# Patient Record
Sex: MALE | Race: WHITE | NOT HISPANIC OR LATINO | Employment: OTHER | URBAN - METROPOLITAN AREA
[De-identification: names, ages, dates, MRNs, and addresses within clinical notes are randomized per-mention and may not be internally consistent; named-entity substitution may affect disease eponyms.]

---

## 2023-11-19 ENCOUNTER — HOSPITAL ENCOUNTER (INPATIENT)
Facility: MEDICAL CENTER | Age: 63
LOS: 2 days | DRG: 300 | End: 2023-11-22
Attending: STUDENT IN AN ORGANIZED HEALTH CARE EDUCATION/TRAINING PROGRAM | Admitting: STUDENT IN AN ORGANIZED HEALTH CARE EDUCATION/TRAINING PROGRAM
Payer: MEDICARE

## 2023-11-19 ENCOUNTER — HOSPITAL ENCOUNTER (OUTPATIENT)
Dept: RADIOLOGY | Facility: MEDICAL CENTER | Age: 63
End: 2023-11-19
Payer: MEDICARE

## 2023-11-19 DIAGNOSIS — I10 HYPERTENSION, UNSPECIFIED TYPE: ICD-10-CM

## 2023-11-19 DIAGNOSIS — K21.9 GASTROESOPHAGEAL REFLUX DISEASE, UNSPECIFIED WHETHER ESOPHAGITIS PRESENT: ICD-10-CM

## 2023-11-19 DIAGNOSIS — I70.0 AORTIC OCCLUSION (HCC): ICD-10-CM

## 2023-11-19 PROCEDURE — 36415 COLL VENOUS BLD VENIPUNCTURE: CPT

## 2023-11-19 PROCEDURE — 96374 THER/PROPH/DIAG INJ IV PUSH: CPT

## 2023-11-19 PROCEDURE — 99285 EMERGENCY DEPT VISIT HI MDM: CPT

## 2023-11-19 PROCEDURE — 93005 ELECTROCARDIOGRAM TRACING: CPT | Performed by: STUDENT IN AN ORGANIZED HEALTH CARE EDUCATION/TRAINING PROGRAM

## 2023-11-19 RX ORDER — HEPARIN SODIUM 5000 [USP'U]/100ML
0-30 INJECTION, SOLUTION INTRAVENOUS CONTINUOUS
Status: DISCONTINUED | OUTPATIENT
Start: 2023-11-20 | End: 2023-11-20

## 2023-11-19 ASSESSMENT — FIBROSIS 4 INDEX: FIB4 SCORE: 1.2

## 2023-11-20 ENCOUNTER — APPOINTMENT (OUTPATIENT)
Dept: RADIOLOGY | Facility: MEDICAL CENTER | Age: 63
DRG: 300 | End: 2023-11-20
Attending: SURGERY
Payer: MEDICARE

## 2023-11-20 PROBLEM — I70.92 CHRONIC TOTAL OCCLUSION OF ARTERY OF THE EXTREMITIES (HCC): Status: ACTIVE | Noted: 2023-11-20

## 2023-11-20 PROBLEM — F22 DELUSIONS (HCC): Status: ACTIVE | Noted: 2023-11-20

## 2023-11-20 PROBLEM — D72.829 LEUKOCYTOSIS: Status: ACTIVE | Noted: 2023-11-20

## 2023-11-20 PROBLEM — E87.20 LACTIC ACIDOSIS: Status: ACTIVE | Noted: 2023-11-20

## 2023-11-20 PROBLEM — F17.200 TOBACCO USE DISORDER: Status: ACTIVE | Noted: 2023-11-20

## 2023-11-20 PROBLEM — I71.40 ABDOMINAL AORTIC ANEURYSM (AAA) WITHOUT RUPTURE (HCC): Status: ACTIVE | Noted: 2023-11-20

## 2023-11-20 PROBLEM — I74.5 ILIAC ARTERY OCCLUSION (HCC): Status: ACTIVE | Noted: 2023-11-20

## 2023-11-20 LAB
ABO + RH BLD: NORMAL
ABO GROUP BLD: NORMAL
ALBUMIN SERPL BCP-MCNC: 4 G/DL (ref 3.2–4.9)
ALBUMIN SERPL BCP-MCNC: 4.3 G/DL (ref 3.2–4.9)
ALBUMIN/GLOB SERPL: 1.3 G/DL
ALBUMIN/GLOB SERPL: 1.3 G/DL
ALP SERPL-CCNC: 151 U/L (ref 30–99)
ALP SERPL-CCNC: 164 U/L (ref 30–99)
ALT SERPL-CCNC: 11 U/L (ref 2–50)
ALT SERPL-CCNC: 9 U/L (ref 2–50)
ANION GAP SERPL CALC-SCNC: 11 MMOL/L (ref 7–16)
ANION GAP SERPL CALC-SCNC: 16 MMOL/L (ref 7–16)
APTT PPP: 148.1 SEC (ref 24.7–36)
AST SERPL-CCNC: 12 U/L (ref 12–45)
AST SERPL-CCNC: 14 U/L (ref 12–45)
BASOPHILS # BLD AUTO: 0.2 % (ref 0–1.8)
BASOPHILS # BLD: 0.02 K/UL (ref 0–0.12)
BILIRUB SERPL-MCNC: 0.4 MG/DL (ref 0.1–1.5)
BILIRUB SERPL-MCNC: 0.4 MG/DL (ref 0.1–1.5)
BLD GP AB SCN SERPL QL: NORMAL
BUN SERPL-MCNC: 11 MG/DL (ref 8–22)
BUN SERPL-MCNC: 11 MG/DL (ref 8–22)
CALCIUM ALBUM COR SERPL-MCNC: 9.1 MG/DL (ref 8.5–10.5)
CALCIUM ALBUM COR SERPL-MCNC: 9.2 MG/DL (ref 8.5–10.5)
CALCIUM SERPL-MCNC: 9.1 MG/DL (ref 8.5–10.5)
CALCIUM SERPL-MCNC: 9.4 MG/DL (ref 8.5–10.5)
CFT BLD TEG: >17 MIN (ref 4.6–9.1)
CFT P HPASE BLD TEG: 8 MIN (ref 4.3–8.3)
CHLORIDE SERPL-SCNC: 100 MMOL/L (ref 96–112)
CHLORIDE SERPL-SCNC: 102 MMOL/L (ref 96–112)
CLOT ANGLE BLD TEG: 50.5 DEGREES (ref 63–78)
CO2 SERPL-SCNC: 20 MMOL/L (ref 20–33)
CO2 SERPL-SCNC: 23 MMOL/L (ref 20–33)
CREAT SERPL-MCNC: 0.83 MG/DL (ref 0.5–1.4)
CREAT SERPL-MCNC: 0.95 MG/DL (ref 0.5–1.4)
CT.EXTRINSIC BLD ROTEM: 3.8 MIN (ref 0.8–2.1)
EKG IMPRESSION: NORMAL
EOSINOPHIL # BLD AUTO: 0.01 K/UL (ref 0–0.51)
EOSINOPHIL NFR BLD: 0.1 % (ref 0–6.9)
ERYTHROCYTE [DISTWIDTH] IN BLOOD BY AUTOMATED COUNT: 43.8 FL (ref 35.9–50)
GFR SERPLBLD CREATININE-BSD FMLA CKD-EPI: 100 ML/MIN/1.73 M 2
GFR SERPLBLD CREATININE-BSD FMLA CKD-EPI: 91 ML/MIN/1.73 M 2
GLOBULIN SER CALC-MCNC: 3.1 G/DL (ref 1.9–3.5)
GLOBULIN SER CALC-MCNC: 3.3 G/DL (ref 1.9–3.5)
GLUCOSE SERPL-MCNC: 109 MG/DL (ref 65–99)
GLUCOSE SERPL-MCNC: 126 MG/DL (ref 65–99)
HCT VFR BLD AUTO: 44.2 % (ref 42–52)
HGB BLD-MCNC: 15.5 G/DL (ref 14–18)
IMM GRANULOCYTES # BLD AUTO: 0.07 K/UL (ref 0–0.11)
IMM GRANULOCYTES NFR BLD AUTO: 0.6 % (ref 0–0.9)
INR PPP: 1.22 (ref 0.87–1.13)
LACTATE SERPL-SCNC: 0.9 MMOL/L (ref 0.5–2)
LACTATE SERPL-SCNC: 2.8 MMOL/L (ref 0.5–2)
LYMPHOCYTES # BLD AUTO: 1.43 K/UL (ref 1–4.8)
LYMPHOCYTES NFR BLD: 12 % (ref 22–41)
MAGNESIUM SERPL-MCNC: 1.9 MG/DL (ref 1.5–2.5)
MCF BLD TEG: 43.4 MM (ref 52–69)
MCF.PLATELET INHIB BLD ROTEM: 19.3 MM (ref 15–32)
MCH RBC QN AUTO: 30 PG (ref 27–33)
MCHC RBC AUTO-ENTMCNC: 35.1 G/DL (ref 32.3–36.5)
MCV RBC AUTO: 85.5 FL (ref 81.4–97.8)
MONOCYTES # BLD AUTO: 0.69 K/UL (ref 0–0.85)
MONOCYTES NFR BLD AUTO: 5.8 % (ref 0–13.4)
NEUTROPHILS # BLD AUTO: 9.73 K/UL (ref 1.82–7.42)
NEUTROPHILS NFR BLD: 81.3 % (ref 44–72)
NRBC # BLD AUTO: 0 K/UL
NRBC BLD-RTO: 0 /100 WBC (ref 0–0.2)
PA AA BLD-ACNC: 3.5 % (ref 0–11)
PA ADP BLD-ACNC: 5.5 % (ref 0–17)
PLATELET # BLD AUTO: 209 K/UL (ref 164–446)
PMV BLD AUTO: 9.3 FL (ref 9–12.9)
POTASSIUM SERPL-SCNC: 3.8 MMOL/L (ref 3.6–5.5)
POTASSIUM SERPL-SCNC: 4.1 MMOL/L (ref 3.6–5.5)
PROT SERPL-MCNC: 7.1 G/DL (ref 6–8.2)
PROT SERPL-MCNC: 7.6 G/DL (ref 6–8.2)
PROTHROMBIN TIME: 15.5 SEC (ref 12–14.6)
RBC # BLD AUTO: 5.17 M/UL (ref 4.7–6.1)
RH BLD: NORMAL
SODIUM SERPL-SCNC: 136 MMOL/L (ref 135–145)
SODIUM SERPL-SCNC: 136 MMOL/L (ref 135–145)
TEG ALGORITHM TGALG: ABNORMAL
UFH PPP CHRO-ACNC: 0.4 IU/ML
UFH PPP CHRO-ACNC: 0.46 IU/ML
WBC # BLD AUTO: 12 K/UL (ref 4.8–10.8)

## 2023-11-20 PROCEDURE — 99223 1ST HOSP IP/OBS HIGH 75: CPT | Mod: 25,AI,GC | Performed by: STUDENT IN AN ORGANIZED HEALTH CARE EDUCATION/TRAINING PROGRAM

## 2023-11-20 PROCEDURE — 83605 ASSAY OF LACTIC ACID: CPT | Mod: 91

## 2023-11-20 PROCEDURE — A9270 NON-COVERED ITEM OR SERVICE: HCPCS | Performed by: STUDENT IN AN ORGANIZED HEALTH CARE EDUCATION/TRAINING PROGRAM

## 2023-11-20 PROCEDURE — 85384 FIBRINOGEN ACTIVITY: CPT

## 2023-11-20 PROCEDURE — 700111 HCHG RX REV CODE 636 W/ 250 OVERRIDE (IP): Mod: JZ | Performed by: STUDENT IN AN ORGANIZED HEALTH CARE EDUCATION/TRAINING PROGRAM

## 2023-11-20 PROCEDURE — 85347 COAGULATION TIME ACTIVATED: CPT

## 2023-11-20 PROCEDURE — 36415 COLL VENOUS BLD VENIPUNCTURE: CPT

## 2023-11-20 PROCEDURE — 86850 RBC ANTIBODY SCREEN: CPT

## 2023-11-20 PROCEDURE — 700102 HCHG RX REV CODE 250 W/ 637 OVERRIDE(OP): Performed by: STUDENT IN AN ORGANIZED HEALTH CARE EDUCATION/TRAINING PROGRAM

## 2023-11-20 PROCEDURE — 93922 UPR/L XTREMITY ART 2 LEVELS: CPT | Mod: 26 | Performed by: INTERNAL MEDICINE

## 2023-11-20 PROCEDURE — 85610 PROTHROMBIN TIME: CPT

## 2023-11-20 PROCEDURE — 85520 HEPARIN ASSAY: CPT | Mod: 91

## 2023-11-20 PROCEDURE — 80053 COMPREHEN METABOLIC PANEL: CPT | Mod: 91

## 2023-11-20 PROCEDURE — 700111 HCHG RX REV CODE 636 W/ 250 OVERRIDE (IP): Performed by: STUDENT IN AN ORGANIZED HEALTH CARE EDUCATION/TRAINING PROGRAM

## 2023-11-20 PROCEDURE — 700105 HCHG RX REV CODE 258: Performed by: STUDENT IN AN ORGANIZED HEALTH CARE EDUCATION/TRAINING PROGRAM

## 2023-11-20 PROCEDURE — 99406 BEHAV CHNG SMOKING 3-10 MIN: CPT | Performed by: STUDENT IN AN ORGANIZED HEALTH CARE EDUCATION/TRAINING PROGRAM

## 2023-11-20 PROCEDURE — 83735 ASSAY OF MAGNESIUM: CPT

## 2023-11-20 PROCEDURE — 99223 1ST HOSP IP/OBS HIGH 75: CPT | Performed by: SURGERY

## 2023-11-20 PROCEDURE — 86901 BLOOD TYPING SEROLOGIC RH(D): CPT

## 2023-11-20 PROCEDURE — 85730 THROMBOPLASTIN TIME PARTIAL: CPT

## 2023-11-20 PROCEDURE — 99222 1ST HOSP IP/OBS MODERATE 55: CPT | Performed by: SURGERY

## 2023-11-20 PROCEDURE — 85025 COMPLETE CBC W/AUTO DIFF WBC: CPT

## 2023-11-20 PROCEDURE — 86900 BLOOD TYPING SEROLOGIC ABO: CPT

## 2023-11-20 PROCEDURE — 770001 HCHG ROOM/CARE - MED/SURG/GYN PRIV*

## 2023-11-20 PROCEDURE — 93922 UPR/L XTREMITY ART 2 LEVELS: CPT

## 2023-11-20 PROCEDURE — 85576 BLOOD PLATELET AGGREGATION: CPT

## 2023-11-20 RX ORDER — OXYCODONE HYDROCHLORIDE 5 MG/1
5 TABLET ORAL EVERY 6 HOURS PRN
Status: DISCONTINUED | OUTPATIENT
Start: 2023-11-20 | End: 2023-11-22 | Stop reason: HOSPADM

## 2023-11-20 RX ORDER — ACETAMINOPHEN 325 MG/1
650 TABLET ORAL EVERY 6 HOURS PRN
Status: DISCONTINUED | OUTPATIENT
Start: 2023-11-20 | End: 2023-11-22 | Stop reason: HOSPADM

## 2023-11-20 RX ORDER — HEPARIN SODIUM 5000 [USP'U]/100ML
0-30 INJECTION, SOLUTION INTRAVENOUS CONTINUOUS
Status: DISCONTINUED | OUTPATIENT
Start: 2023-11-20 | End: 2023-11-21

## 2023-11-20 RX ORDER — SODIUM CHLORIDE, SODIUM LACTATE, POTASSIUM CHLORIDE, CALCIUM CHLORIDE 600; 310; 30; 20 MG/100ML; MG/100ML; MG/100ML; MG/100ML
INJECTION, SOLUTION INTRAVENOUS CONTINUOUS
Status: DISCONTINUED | OUTPATIENT
Start: 2023-11-20 | End: 2023-11-21

## 2023-11-20 RX ORDER — NICOTINE 21 MG/24HR
21 PATCH, TRANSDERMAL 24 HOURS TRANSDERMAL
Status: DISCONTINUED | OUTPATIENT
Start: 2023-11-20 | End: 2023-11-22 | Stop reason: HOSPADM

## 2023-11-20 RX ORDER — AMLODIPINE BESYLATE 10 MG/1
10 TABLET ORAL
Status: DISCONTINUED | OUTPATIENT
Start: 2023-11-21 | End: 2023-11-22 | Stop reason: HOSPADM

## 2023-11-20 RX ORDER — POLYETHYLENE GLYCOL 3350 17 G/17G
1 POWDER, FOR SOLUTION ORAL
Status: DISCONTINUED | OUTPATIENT
Start: 2023-11-20 | End: 2023-11-22 | Stop reason: HOSPADM

## 2023-11-20 RX ORDER — BISACODYL 10 MG
10 SUPPOSITORY, RECTAL RECTAL
Status: DISCONTINUED | OUTPATIENT
Start: 2023-11-20 | End: 2023-11-22 | Stop reason: HOSPADM

## 2023-11-20 RX ORDER — ONDANSETRON 2 MG/ML
4 INJECTION INTRAMUSCULAR; INTRAVENOUS EVERY 4 HOURS PRN
Status: DISCONTINUED | OUTPATIENT
Start: 2023-11-20 | End: 2023-11-22 | Stop reason: HOSPADM

## 2023-11-20 RX ORDER — AMLODIPINE BESYLATE 5 MG/1
5 TABLET ORAL ONCE
Status: COMPLETED | OUTPATIENT
Start: 2023-11-20 | End: 2023-11-20

## 2023-11-20 RX ORDER — AMLODIPINE BESYLATE 5 MG/1
5 TABLET ORAL
Status: DISCONTINUED | OUTPATIENT
Start: 2023-11-20 | End: 2023-11-20

## 2023-11-20 RX ORDER — AMOXICILLIN 250 MG
2 CAPSULE ORAL 2 TIMES DAILY
Status: DISCONTINUED | OUTPATIENT
Start: 2023-11-20 | End: 2023-11-22 | Stop reason: HOSPADM

## 2023-11-20 RX ORDER — MORPHINE SULFATE 4 MG/ML
4 INJECTION INTRAVENOUS EVERY 4 HOURS PRN
Status: DISCONTINUED | OUTPATIENT
Start: 2023-11-20 | End: 2023-11-22 | Stop reason: HOSPADM

## 2023-11-20 RX ORDER — HEPARIN SODIUM 1000 [USP'U]/ML
40 INJECTION, SOLUTION INTRAVENOUS; SUBCUTANEOUS PRN
Status: DISCONTINUED | OUTPATIENT
Start: 2023-11-20 | End: 2023-11-21

## 2023-11-20 RX ORDER — ONDANSETRON 4 MG/1
4 TABLET, ORALLY DISINTEGRATING ORAL EVERY 4 HOURS PRN
Status: DISCONTINUED | OUTPATIENT
Start: 2023-11-20 | End: 2023-11-22 | Stop reason: HOSPADM

## 2023-11-20 RX ORDER — FAMOTIDINE 20 MG/1
20 TABLET, FILM COATED ORAL 2 TIMES DAILY
Status: DISCONTINUED | OUTPATIENT
Start: 2023-11-20 | End: 2023-11-22 | Stop reason: HOSPADM

## 2023-11-20 RX ADMIN — SODIUM CHLORIDE, POTASSIUM CHLORIDE, SODIUM LACTATE AND CALCIUM CHLORIDE: 600; 310; 30; 20 INJECTION, SOLUTION INTRAVENOUS at 02:58

## 2023-11-20 RX ADMIN — ONDANSETRON 4 MG: 2 INJECTION INTRAMUSCULAR; INTRAVENOUS at 16:48

## 2023-11-20 RX ADMIN — OXYCODONE 5 MG: 5 TABLET ORAL at 03:20

## 2023-11-20 RX ADMIN — FAMOTIDINE 20 MG: 20 TABLET ORAL at 05:45

## 2023-11-20 RX ADMIN — HEPARIN SODIUM 18 UNITS/KG/HR: 5000 INJECTION, SOLUTION INTRAVENOUS at 00:19

## 2023-11-20 RX ADMIN — FAMOTIDINE 20 MG: 20 TABLET ORAL at 16:06

## 2023-11-20 RX ADMIN — MORPHINE SULFATE 4 MG: 4 INJECTION, SOLUTION INTRAMUSCULAR; INTRAVENOUS at 05:50

## 2023-11-20 RX ADMIN — AMLODIPINE BESYLATE 5 MG: 5 TABLET ORAL at 13:50

## 2023-11-20 RX ADMIN — HEPARIN SODIUM 18 UNITS/KG/HR: 5000 INJECTION, SOLUTION INTRAVENOUS at 21:09

## 2023-11-20 RX ADMIN — NICOTINE TRANSDERMAL SYSTEM 21 MG: 21 PATCH, EXTENDED RELEASE TRANSDERMAL at 05:45

## 2023-11-20 RX ADMIN — SODIUM CHLORIDE, POTASSIUM CHLORIDE, SODIUM LACTATE AND CALCIUM CHLORIDE: 600; 310; 30; 20 INJECTION, SOLUTION INTRAVENOUS at 22:56

## 2023-11-20 RX ADMIN — DOCUSATE SODIUM 50 MG AND SENNOSIDES 8.6 MG 2 TABLET: 8.6; 5 TABLET, FILM COATED ORAL at 16:06

## 2023-11-20 RX ADMIN — AMLODIPINE BESYLATE 5 MG: 5 TABLET ORAL at 05:44

## 2023-11-20 RX ADMIN — OXYCODONE 5 MG: 5 TABLET ORAL at 22:11

## 2023-11-20 RX ADMIN — DOCUSATE SODIUM 50 MG AND SENNOSIDES 8.6 MG 2 TABLET: 8.6; 5 TABLET, FILM COATED ORAL at 05:44

## 2023-11-20 ASSESSMENT — COGNITIVE AND FUNCTIONAL STATUS - GENERAL
SUGGESTED CMS G CODE MODIFIER MOBILITY: CJ
CLIMB 3 TO 5 STEPS WITH RAILING: A LITTLE
WALKING IN HOSPITAL ROOM: A LITTLE
DRESSING REGULAR UPPER BODY CLOTHING: A LITTLE
SUGGESTED CMS G CODE MODIFIER DAILY ACTIVITY: CI
DAILY ACTIVITIY SCORE: 23
MOBILITY SCORE: 22

## 2023-11-20 ASSESSMENT — ENCOUNTER SYMPTOMS
INSOMNIA: 1
HALLUCINATIONS: 0
CLAUDICATION: 0
HEMOPTYSIS: 0
CHILLS: 0
PALPITATIONS: 0
SENSORY CHANGE: 0
NERVOUS/ANXIOUS: 0
BLURRED VISION: 0
MYALGIAS: 0
SORE THROAT: 0
EYE PAIN: 0
ABDOMINAL PAIN: 1
DIARRHEA: 0
BACK PAIN: 0
DEPRESSION: 0
VOMITING: 0
SPEECH CHANGE: 0
DOUBLE VISION: 0
FALLS: 0
HEARTBURN: 0
CONSTIPATION: 0
BRUISES/BLEEDS EASILY: 0
DIZZINESS: 0
BLOOD IN STOOL: 0
FOCAL WEAKNESS: 0
NECK PAIN: 0
NAUSEA: 0
SPUTUM PRODUCTION: 0
POLYDIPSIA: 0
WEAKNESS: 0
COUGH: 0
HEADACHES: 0
FEVER: 0
ORTHOPNEA: 0
SHORTNESS OF BREATH: 0

## 2023-11-20 ASSESSMENT — PATIENT HEALTH QUESTIONNAIRE - PHQ9
1. LITTLE INTEREST OR PLEASURE IN DOING THINGS: NOT AT ALL
SUM OF ALL RESPONSES TO PHQ9 QUESTIONS 1 AND 2: 0
2. FEELING DOWN, DEPRESSED, IRRITABLE, OR HOPELESS: NOT AT ALL

## 2023-11-20 ASSESSMENT — PAIN DESCRIPTION - PAIN TYPE
TYPE: ACUTE PAIN
TYPE: ACUTE PAIN

## 2023-11-20 ASSESSMENT — FIBROSIS 4 INDEX: FIB4 SCORE: 1.23

## 2023-11-20 ASSESSMENT — LIFESTYLE VARIABLES
HAVE YOU EVER FELT YOU SHOULD CUT DOWN ON YOUR DRINKING: NO
TOTAL SCORE: 0
TOTAL SCORE: 0
CONSUMPTION TOTAL: NEGATIVE
EVER HAD A DRINK FIRST THING IN THE MORNING TO STEADY YOUR NERVES TO GET RID OF A HANGOVER: NO
ON A TYPICAL DAY WHEN YOU DRINK ALCOHOL HOW MANY DRINKS DO YOU HAVE: 0
HAVE PEOPLE ANNOYED YOU BY CRITICIZING YOUR DRINKING: NO
TOTAL SCORE: 0
EVER FELT BAD OR GUILTY ABOUT YOUR DRINKING: NO
ALCOHOL_USE: NO
AVERAGE NUMBER OF DAYS PER WEEK YOU HAVE A DRINK CONTAINING ALCOHOL: 0
HOW MANY TIMES IN THE PAST YEAR HAVE YOU HAD 5 OR MORE DRINKS IN A DAY: 0

## 2023-11-20 NOTE — PROGRESS NOTES
VASCULAR SURGERY SERVICE                        Progress Note  _________________________________      Patient went to [facility] last night for back pain and CT scan showed incidental finding of occlusion of aortoiliac aneurysm.  Patient was transferred to [facility] overnight for concern of acute lower extremity ischemia.    Patient does not have any evidence of acute lower extremity ischemia.  There is no pain or numbness, skin is pink and well-perfused with brisk capillary refill.  No calf tenderness, normal motor exam.    Patient does however have some evidence of chronic arterial insufficiency in the lower extremities.  He reports that he does get bilateral lower extremity pain with walking less than 100 feet.  He denies rest pain or numbness except that his left foot has some residual numbness from previous surgeries.  On exam he has weak monophasic posterior tibial Doppler signals bilaterally consistent with chronic arterial insufficiency.    Patient may warrant an aortobifemoral bypass at some point for revascularization of his lower extremities, however this does not need to be done emergently.  At this point I have ordered an ASHLEY to further assess the degree of ischemia and also an echocardiogram to assess his fitness for surgery.  If the ASHLEY indicates a severe degree of ischemia then we may need to consider doing his aortobifemoral bypass patient, however if the ASHLEY is only moderately reduced then patient could potentially be discharged and followed up as an outpatient.    Further recommendations pending the results of the ASHLEY and echocardiogram    Ady Lunsford MD  [facility] Surgery   Voalte preferred or call my office [phone]  __________________________________________________  Patient:Andrea Polanco .   MRN:[id number]

## 2023-11-20 NOTE — DISCHARGE PLANNING
"Care Transition Team Assessment    CM reviewed chart and participated in IDT rounds.  Clinical RN reports that Andrea has some questions about insurance coverage for upcoming surgery mid December.  PFA number given.  CM met with patient at bedside. AOX4, lives by self in a single level home with no stairs/steps.  Has no needs except he willl need help with transportation home on 11/21.  I explained that we will assist with that.    Information Source  Orientation Level: Oriented X4    Readmission Evaluation  Is this a readmission?: No    Elopement Risk  Legal Hold: No  Ambulatory or Self Mobile in Wheelchair: Yes  Disoriented: No  Psychiatric Symptoms: Hallucinations-at Risk for Elopement (saw \"cat\" at times)  Elopement this Admit: No  Vocalizing Wanting to Leave: No  Displays Behaviors, Body Language Wanting to Leave: No-Not at Risk for Elopement    Interdisciplinary Discharge Planning  Lives with - Patient's Self Care Capacity: Alone and Able to Care For Self  Patient or legal guardian wants to designate a caregiver: No  Support Systems: Friends / Neighbors  Housing / Facility: [address]  Durable Medical Equipment:  (cane)    Discharge Preparedness  Prior Functional Level: Independent with Activities of Daily Living  Difficulity with ADLs: None  Difficulity with IADLs: Driving  Difficulity with IADL Comments: uses taxi cabs    Functional Assesment  Prior Functional Level: Independent with Activities of Daily Living         Vision / Hearing Impairment  Vision Impairment : Yes  Right Eye Vision: Other (Comments) (reports hx of glaucoma)  Left Eye Vision: Other (Comments) (reports hx of cataract and glaucoma)  Hearing Impairment : Yes  Hearing Impairment: Left Ear, Patient Declines to Wear Hearing Device(s)              Domestic Abuse  Have you ever been the victim of abuse or violence?: No  Physical Abuse or Sexual Abuse: No  Verbal Abuse or Emotional Abuse: No  Possible Abuse/Neglect Reported to:: Not " Applicable

## 2023-11-20 NOTE — ED NOTES
Checked on bed, connected to monitor,  with unlabored respirations. Vital monitoring.   Denied any new complaints. No current needs identified.  Gurney in low position, side rail up for pt safety. Call light within reach.

## 2023-11-20 NOTE — PROGRESS NOTES
Assumed care of patient. A&Ox4, NPO since midnight. Fall precautions in place and heparin running IV per protocol. Pt c/o nausea, MD notified. Will continue to monitor.

## 2023-11-20 NOTE — ED NOTES
Med Rec complete per patient  Allergies reviewed with patient  Patient denies taking any RX or OTC meds in the last 30 days    Chyna Noble CPhT

## 2023-11-20 NOTE — PROGRESS NOTES
Patient was admitted overnight with iliac artery occlusion  CT abdomen from outside facility showed 4.7 cm and 5.4 cm aortic and iliac artery aneurysms respectively thrombosed,     Patient was seen and examined at bedside    Patient had nausea vomiting this morning.  I ordered IV Zofran as needed.  QTc 454  He reported R lower back pain yesterday, now it's more in abdominal area, L>R.     Vascular surgeon consulted,  will see patient today. pending recommendation  Continue heparin drip    BP high, I increased amlodipine to 10 mg    LA 2.8 >0.9    I discussed advance care planning with the patient at bedside, including diagnosis, prognosis, plan of care, risks and benefits of any therapies that could be offered.   We discussed regarding CODE STATUS, CPR and intubation with mechanical ventilation, discussed regarding risk and benefits. The patient is willing to  have all life sustaining efforts. Continue full code.  ACP: 16min

## 2023-11-20 NOTE — CARE PLAN
The patient is Watcher - Medium risk of patient condition declining or worsening    Shift Goals  Clinical Goals: heparin gtt, surgery?, pulse checks, pain management  Patient Goals: rest, feels better    Progress made toward(s) clinical / shift goals:    A&O 4.  Ambulated with one person assist and cane.  Moves all extremities.    Patient is not progressing towards the following goals:  Heparin gtt.  Possible surgery today.    Problem: Pain - Standard  Goal: Alleviation of pain or a reduction in pain to the patient’s comfort goal  Outcome: Progressing  Note: Oxycodone given with some relief. Resting now.      Problem: Knowledge Deficit - Standard  Goal: Patient and family/care givers will demonstrate understanding of plan of care, disease process/condition, diagnostic tests and medications  Outcome: Progressing  Note: Plan of care reviewed including heparin gtt, diagnosis, vascular consult and checking CMS. Verbalized understanding and agrees.

## 2023-11-20 NOTE — DIETARY
"Nutrition services: Day 0 of admit.  Andrea Polanco Jr. is a 63 y.o. 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 Pt w/ AAA w/out rupture. Pt is NPO for possible surgery. Surgery is going to evaluate pt today.   Pt either sleeping soundly or busy with other staff when RD tried to visit. Pt noted to have some muscle wasting in temple area. Of note, based on pt's picture on his NV ID, indentation in temple area appears to be normal for this pt. His wt on his NV ID was also similar to his wt now.   Wt hx reviewed in Epic. Pt weighed 68 kg on 1/9/23 and 65.8 kg on 4/3/23. Wt has been stable x 7 months.     Malnutrition Risk: unable to determine    Recommendations/Plan:  Initiate diet when medically feasible   Monitor weight.      RD will follow  "

## 2023-11-20 NOTE — THERAPY
Physical Therapy Contact Note    Patient Name: Andrea Polanco Jr.  Age:  63 y.o., Sex:  male  Medical Record #: [id number]  Today's Date: 11/20/2023    PT consult received. Pt currently pending ASHLEY and echo for potential bypass surgery. Bedside RN reporting that pt is mobilizing in his room with SPC. Will follow up once medical POC is established after work up is complete.    Shane Anderson, PT, DPT

## 2023-11-20 NOTE — ASSESSMENT & PLAN NOTE
CT abdomen and pelvis with contrast demonstrating a 4.7 cm and 5.4 cm aortic and iliac artery aneurysms respectively thrombosed,  Vascular surgery following appreicate rec,  On heparin drip  ASHLEY showing moderately reduced  ankle  brachial index

## 2023-11-20 NOTE — PROGRESS NOTES
4 Eyes Skin Assessment Completed by EKTA Finnegan and EKTA Cervantes.    Head WDL  Ears WDL  Nose WDL  Mouth WDL  Neck WDL  Breast/Chest WDL  Shoulder Blades WDL  Spine WDL  (R) Arm/Elbow/Hand WDL  (L) Arm/Elbow/Hand WDL  Abdomen WDL  Groin WDL  Scrotum/Coccyx/Buttocks WDL  (R) Leg WDL  (L) Leg WDL  (R) Heel/Foot/Toe WDL  (L) Heel/Foot/Toe WDL          Devices In Places Tele Box and Pulse Ox      Interventions In Place Pressure Redistribution Mattress    Possible Skin Injury No    Pictures Uploaded Into Epic N/A  Wound Consult Placed N/A  RN Wound Prevention Protocol Ordered No

## 2023-11-20 NOTE — PROGRESS NOTES
Pt admitted to room T835-1 from R12 at 0254.  Pt  a&o x4, repetitive, [place] at left ear . Complaints of back and generalized pain. Reports it is chronic in nature. Wiggling toes. No pulses doppled to bilat tibial and pedal. Bilat groin pulses +doppler. Denies abdominal pain, no n/v. Ambulating with cane and SBA.  FORRESTER.  On RA. Respirations equal and unlabored. Oriented to room call light and vitals frequency.  Reviewed plan of care: NPO diet, fall precautions, skin care, labs, heparin gtt, pulses check,and pain management with patient. . Passed RN bedside swallow evaluation without s/sx of aspiration. All questions answered. White board updated. Verbalized understanding and agrees. Able to make needs known.

## 2023-11-20 NOTE — ASSESSMENT & PLAN NOTE
Mild leukocytosis of 12,000 likely reactive denies any fevers, any signs or symptoms concerning for acute infection though thrombophlebitis could be on differential given aortic artery occlusion  - Continue monitor clinically and on CBC    -trending down

## 2023-11-20 NOTE — CONSULTS
Vascular Surgery          New Patient Consultation    Patient:Andrea Polanco Jr.  MRN:[id number]    Date: 11/20/2023    Referring Provider: Miller Cazares M.d.    Consulting Physician: Bashir Bowling MD  _____________________________________________________    Reason for consultation:  Evaluate patient with CT findings of thrombosed aortic and iliac artery aneurysm    HPI:  This is a 63 y.o. male who presented to [facility] with back pain.  The patient reports that he has intermittent abdominal and back pain over the past several months to years but reports that he had an episode of back pain that was quite severe.  Patient presented to [facility] for further evaluation.  Patient underwent a CTA which demonstrates an abdominal aortic as well as bilateral iliac artery aneurysms which are thrombosed.  There is collateralization with reconstitution of the vessels distally in the external iliac.  With respect to the patient's lower extremities he reports that he has been having issues with his legs for years.  He says over the past Several months he has had deteriorating walking distance and deteriorating symptoms related to his lower extremities.  He reports that his lower extremities did not bring him to the hospital it was his back pain.  On examination the patient's has normal motor and sensation of his lower extremities.  There is no mottling present.  There are absent pulses.  There is no evidence of critical limb ischemia.    Past Medical History:   Diagnosis Date    Anxiety     Arthritis     Muscle disorder     Osteoporosis, unspecified     Seizure (HCC)     Ulcer     Unspecified cataract        Past Surgical History:   Procedure Laterality Date    ANKLE FUSION  1999    ANKLE LIGAMENT RECONSTRUCTION  1995    ANKLE ARTHROSCOPY  1992    TONSILLECTOMY         Current Facility-Administered Medications   Medication Dose Route Frequency Provider Last Rate Last Admin    heparin infusion  25,000 units in 500 mL 0.45% NACL  0-30 Units/kg/hr Intravenous Continuous Miller Cazares M.D. 24.5 mL/hr at 11/20/23 0019 18 Units/kg/hr at 11/20/23 0019    heparin injection 2,700 Units  40 Units/kg Intravenous PRN Miller Cazares M.D.         Current Outpatient Medications   Medication Sig Dispense Refill    hydrocodone/acetaminophen (NORCO)  MG TABS Take 1-2 Tabs by mouth every 6 hours as needed. (Patient not taking: Reported on 12/8/2022)         Social History     Socioeconomic History    Marital status: Single     Spouse name: Not on file    Number of children: Not on file    Years of education: Not on file    Highest education level: Not on file   Occupational History    Not on file   Tobacco Use    Smoking status: Every Day     Current packs/day: 1.00     Types: Cigarettes    Smokeless tobacco: Current   Vaping Use    Vaping Use: Never used   Substance and Sexual Activity    Alcohol use: Never    Drug use: Yes     Types: Marijuana     Comment: as needed for pain    Sexual activity: Not on file   Other Topics Concern    Not on file   Social History Narrative    Not on file     Social Determinants of Health     Financial Resource Strain: Not on file   Food Insecurity: Not on file   Transportation Needs: Not on file   Physical Activity: Not on file   Stress: Not on file   Social Connections: Not on file   Intimate Partner Violence: Not on file   Housing Stability: Not on file       No family history on file.    Allergies:  Aspirin    Review of Systems:    Constitutional: Negative for fever or chills  HENT:   Negative for hearing loss or tinnitus    Eyes:    Negative for blurred vision or loss of vision  Respiratory:  Negative for cough or hemoptysis  Cardiac:  Negative for chest pain or palpitations  Vascular:  Positive for claudication, Negative for rest pain  Gastrointestinal: Negative for vomiting or abdominal pain     Negative for hematochezia or melena   Genitourinary: Negative for dysuria or  "hematuria   Musculoskeletal: Patient reports back pain   skin:   Negative for itching or rash  Neurological:  Negative for dizziness or headaches     Negative for speech disturbance     Negative for extremity weakness or paresthesias  Endo/Heme:  Negative for easy bruising or bleeding    Physical Exam:  BP (!) 142/84   Pulse 65   Temp 36.2 °C (97.1 °F) (Temporal)   Resp 17   Ht 1.753 m (5' 9\")   Wt 68 kg (150 lb)   SpO2 95%   BMI 22.15 kg/m²     Constitutional: Alert, oriented, no acute distress  HEENT:  Normocephalic and atraumatic, EOMI  Neck:   Supple, no JVD,   Cardiovascular: Regular rate and rhythm,   Pulmonary:  Good air entry bilaterally,    Abdominal:  Soft, non-tender, non-distended         Musculoskeletal: No tenderness, no deformity  Neurological:  Motor sensation normal lower extremities  Skin:   Skin slightly cool and dry. No rash noted.  Vascular exam: Absent femoral popliteal and pedal pulses bilaterally      Labs:  Recent Labs     11/19/23  1915 11/20/23  0001   WBC  --  12.0*   RBC 5.03 5.17   HEMOGLOBIN 14.7 15.5   HEMATOCRIT 42.6 44.2   MCV 84.7 85.5   MCH 29.3 30.0   MCHC 34.5 35.1   RDW 14.7* 43.8   PLATELETCT 203 209   MPV 7.1 9.3     Recent Labs     11/20/23  0001   SODIUM 136   POTASSIUM 4.1   CHLORIDE 100   CO2 20   GLUCOSE 126*   BUN 11   CREATININE 0.95   CALCIUM 9.4         Recent Labs     11/20/23  0001   ASTSGOT 14   ALTSGPT 11   TBILIRUBIN 0.4   ALKPHOSPHAT 164*   GLOBULIN 3.3         Radiology:  CTA 11/19/2023  1.  Marked aneurysmal dilatation of the distal abdominal aorta and common iliac   arteries. There is near occlusion of the distal aorta. The common iliac arteries   are occluded with flow reconstituted within the external and internal iliac   arteries via collaterals. Subspecialty consultation is recommended.   2.  Right renal atrophy. Hypoenhancement of the inferior pole of the right   kidney is likely vascular in nature but correlation with urinalysis to exclude "   infection is recommended.   3.  Mild cardiomegaly.   4.  Coronary atherosclerosis.       Assessment/Plan:   -4.7 cm and 5.4 cm aortic and iliac artery aneurysm respectively thrombosed.  Difficult to tell when the aneurysm is thrombosed or if the back pain is related.  The patient does not suffer from critical limb ischemia at this time.  The patient does warrant aortoiliac reconstruction and repair of the aneurysms with revascularization of the lower extremities.  Patient will be evaluated by  in the morning and further discussions will be had with respect to timing of surgery.    I had a long discussion with the patient discussed the pathophysiology and natural history of aortic aneurysms and specifically thrombosed aneurysms.  We discussed the need for reconstruction but we also discussed the timing will be determined over the next day or 2.      Bashir Bowling MD  [facility]   Voalte preferred or call my office [phone]  __________________________________________________________________  Patient:Andrea Newberrytian Dee   MRN:[id number]   [facility]:[id number]

## 2023-11-20 NOTE — ED TRIAGE NOTES
Chief Complaint   Patient presents with    Abdominal Pain     Flank pain      Patient BIBA from [facility] for vascular consultation. Patient stated he started having right side abdominal pain radiating to back and right lower limb. Patient diagnosed AAA at the facility scan shows illiac artery total occlusion. Patient received initial treatment from the facility and transfer to here. Pt remain vitally stable with EMS. Pt received on a heparin infusion @ 15units/kg/hr.

## 2023-11-20 NOTE — ASSESSMENT & PLAN NOTE
Patient denies previous psych history, denies any anxiety depression SI or HI, denying any hallucinations, very pleasant overall individual.  Patient discussing delusions that his neighbor upstairs at his apartment was causing his health issues through unknown means and was a hacker as well.  - Continue to monitor clinically  -Would likely avoid any Chantix or Wellbutrin for smoking cessation

## 2023-11-20 NOTE — ASSESSMENT & PLAN NOTE
Mild lactic acidosis likely secondary to aortic aneurysm thrombosis and iliac artery occlusion, no critical limb ischemia at this time, patient could also be dehydrated as has not had anything to eat or drink since early this a.m.  - Continue to monitor on repeat lactic acid  - Maintenance LR    -resolved

## 2023-11-20 NOTE — ED PROVIDER NOTES
ER Provider Note    Scribed for Daisy Cazares M.D. by Grace Ann. 11/19/2023   11:51 PM    Primary Care Provider: Livan Hutchison M.D.    CHIEF COMPLAINT  Chief Complaint   Patient presents with    Abdominal Pain     Flank pain      EXTERNAL RECORDS REVIEWED  External ED Note seen at [facility] for right flank pain and leg pain starting this evening, diagnosed with acute aortic occlusion, transferred here for further evaluation.    HPI/ROS  LIMITATION TO HISTORY   Select: Underlying mental illness  OUTSIDE HISTORIAN(S):  None noted spoke with Dr. Dos Santos, emergency physician, who initiated heparin, discussed the case with the vascular surgeon on-call here Dr. Justin Polanco Jr. is a 63 y.o. male who presents to the ED as a transfer from [facility] a consult with vascular following AAA diagnosis showing total iliac artery occlusion. The patient reports an onset of right sided flank pain radiating to his right lower extremity onset starting at 5:00 PM tonight. The patient denies any changes to sensation in his bilateral feet and legs. He notes he last ate today at 4:00 PM. Per RN, the patient was started on Heparin infusion at 15 units/kg/hr.     PAST MEDICAL HISTORY  Past Medical History:   Diagnosis Date    Anxiety     Arthritis     Muscle disorder     Osteoporosis, unspecified     Seizure (HCC)     Ulcer     Unspecified cataract        SURGICAL HISTORY  Past Surgical History:   Procedure Laterality Date    ANKLE FUSION  1999    ANKLE LIGAMENT RECONSTRUCTION  1995    ANKLE ARTHROSCOPY  1992    TONSILLECTOMY         FAMILY HISTORY  None noted    SOCIAL HISTORY   reports that he has been smoking cigarettes. He uses smokeless tobacco. He reports current drug use. Drug: Marijuana. He reports that he does not drink alcohol.    CURRENT MEDICATIONS  Previous Medications    HYDROCODONE/ACETAMINOPHEN (NORCO)  MG TABS    Take 1-2 Tabs by mouth every 6 hours as needed.       ALLERGIES  Allergies  "  Allergen Reactions    Aspirin Unspecified     Stomach ulcers, GI upset        PHYSICAL EXAM  BP (!) 167/80   Pulse 65   Temp 36.2 °C (97.1 °F) (Temporal)   Resp 17   Ht 1.753 m (5' 9\")   Wt 68 kg (150 lb)   SpO2 90%   BMI 22.15 kg/m²    General: Agitated pressured speech  Head: Normocephalic atraumatic  Eyes: Extraocular motion intact  Neck: Supple, no rigidity  Cardiovascular: Regular rate and rhythm no murmurs rubs or gallops,  Respiratory: Clear to auscultation bilaterally, equal chest rise and fall, no increased work of breathing  Abdomen: Soft nontender no guarding  Musculoskeletal: no peripheral edema, chronically appearing deformity to the left foot cold distal extremities without dopplerable pulse bilaterally, sensation grossly intact, capillary refill prolonged but present approximately 4 seconds bilaterally  Neuro: Alert, no focal deficits  Integumentary: No wounds or rashes,     DIAGNOSTIC STUDIES    Labs:   Labs Reviewed   CBC WITH DIFFERENTIAL - Abnormal; Notable for the following components:       Result Value    WBC 12.0 (*)     Neutrophils-Polys 81.30 (*)     Lymphocytes 12.00 (*)     Neutrophils (Absolute) 9.73 (*)     All other components within normal limits    Narrative:     Indicate which anticoagulants the patient is on:->UNKNOWN  Do you want to extend TEG graph to LY30? (If no, graph will  terminate at MA)->No   COMP METABOLIC PANEL - Abnormal; Notable for the following components:    Glucose 126 (*)     Alkaline Phosphatase 164 (*)     All other components within normal limits    Narrative:     Indicate which anticoagulants the patient is on:->UNKNOWN  Do you want to extend TEG graph to LY30? (If no, graph will  terminate at MA)->No   LACTIC ACID - Abnormal; Notable for the following components:    Lactic Acid 2.8 (*)     All other components within normal limits    Narrative:     Indicate which anticoagulants the patient is on:->UNKNOWN  Do you want to extend TEG graph to LY30? (If " no, graph will  terminate at MA)->No   PROTHROMBIN TIME - Abnormal; Notable for the following components:    PT 15.5 (*)     INR 1.22 (*)     All other components within normal limits    Narrative:     Indicate which anticoagulants the patient is on:->UNKNOWN  Do you want to extend TEG graph to LY30? (If no, graph will  terminate at MA)->No   APTT - Abnormal; Notable for the following components:    APTT 148.1 (*)     All other components within normal limits    Narrative:     Indicate which anticoagulants the patient is on:->UNKNOWN  Do you want to extend TEG graph to LY30? (If no, graph will  terminate at MA)->No   COD (ADULT)   ESTIMATED GFR    Narrative:     Indicate which anticoagulants the patient is on:->UNKNOWN  Do you want to extend TEG graph to LY30? (If no, graph will  terminate at MA)->No   MAGNESIUM    Narrative:     Indicate which anticoagulants the patient is on:->UNKNOWN  Do you want to extend TEG graph to LY30? (If no, graph will  terminate at MA)->No   PLATELET MAPPING WITH BASIC TEG    Narrative:     Indicate which anticoagulants the patient is on:->UNKNOWN  Do you want to extend TEG graph to LY30? (If no, graph will  terminate at MA)->No   ABO RH CONFIRM   Leukocytosis, mildly elevated lactate, elevated APTT in the setting of heparin administration.    EKG:   I have independently interpreted this EKG as detailed above.  EKG: Normal sinus rhythm rate of 63, normal axis, Q waves in leads III, aVF, no ST elevations.  Normal intervals.    Radiology:       Radiologist interpretation:   OUTSIDE IMAGES-DX CHEST   Final Result      OUTSIDE IMAGES-CT CHEST   Final Result      OUTSIDE IMAGES-CT ABDOMEN /PELVIS   Final Result           INITIAL ASSESSMENT COURSE AND PLAN  Care Narrative 63-year-old male presenting with acute on chronic lower extremity and back pain, found to have aortic occlusion suspected to be acute on imaging, transferred here for vascular surgery evaluation and potential intervention.   Vital signs are reassuring, on exam, patient does have cold distal extremities, with difficult to obtain pulses, however he does have capillary refill, he is able to ambulate, motor and sensation appears to be intact.        11:51 PM - Patient was evaluated at bedside as a transfer. Ordered for EKG, Heparin, Platelet Mapping with basic Teg, APTT, INR, Lactic Acid, CMP and CBC with diff to evaluate. The patient will be medicated with Heparin 2,700 units for his symptoms. Patient verbalizes understanding and support with my plan of care.     ED Observation Status? No; Patient does not meet criteria for ED Observation.     12:02 AM I discussed the patient's case and the above findings with Dr. Bowling (Vascular) who will consult.  Dr. Anderson recommends admission to medical floor on heparin, they will reevaluate in the morning, determine whether acute surgical intervention is necessary.    CRITICAL CARE  The very real possibilty of a deterioration of this patient's condition required the highest level of my preparedness for sudden, emergent intervention.  I provided critical care services, which included medication orders, frequent reevaluations of the patient's condition and response to treatment, ordering and reviewing test results, and discussing the case with various consultants.  The critical care time associated with the care of the patient was 32 minutes. Review chart for interventions. This time is exclusive of any other billable procedures.       DISPOSITION AND DISCUSSIONS    I have discussed management of the patient with the following physicians and TIMOTEO's:  Dr. Bowling (Vascular), & Dr White and [facility] IM resident.    Discussion of management with other Q or appropriate source(s): Pharmacy need for repeat bolus of heparin, appropriate heparin dosing            DISPOSITION:  Patient will be hospitalized by Dr. White in guarded condition.    FINAL DIAGNOSIS  1. Aortic occlusion (HCC)         Grace ARAUZ  (Scribe), am scribing for, and in the presence of, Miller Cazares M.D..    Electronically signed by: Grace Ann (Scribe), 11/19/2023    IMiller M.D. personally performed the services described in this documentation, as scribed by Grace Ann in my presence, and it is both accurate and complete.      The note accurately reflects work and decisions made by me.  Miller Cazares M.D.  11/20/2023  1:55 AM

## 2023-11-20 NOTE — ED NOTES
Spoke with Rn of Fabio Hernandez who cared for patient prior to transfer. Heparin bolus 4,720 units given at 2212, heparin drip at 15 u/kg/hr initiated at 2213

## 2023-11-20 NOTE — ED NOTES
Randal from Lab called with critical result of APTT 148.1 at 0057. Critical lab result read back to [facility].   Dr. Cazares notified of critical lab result at 0058. Waiting for further order.

## 2023-11-20 NOTE — H&P
[facility] Internal Medicine History & Physical Note    Date of Service  11/20/2023    [facility] Team: IM NIGHTS  Attending: Miller Cazares M.d.  Senior Resident: Dr. Taylor  Contact Number: [phone]    Primary Care Physician  Livan Hutchison M.D.    Consultants  Vascular surgery    Specialist Names: Dr. Anderson    Code Status  Full code    Chief Complaint  Chief Complaint   Patient presents with    Abdominal Pain     Flank pain        History of Presenting Illness (HPI):   Andrea Polanco Jr. is a 63 y.o. male with a past medical history significant for tobacco use, glaucoma, cataracts, gastric ulcers?,  Osteoarthritis of the right knee, left ankle tendon rupture status postsurgical repair, seizures noted in chart 1985 last patient denies,  who presented 11/19/2023 as a transfer from [facility] emergency department with concern for a 1 day history of a pulling right sided abdominal pain starting on right flank then moving towards midline, worsening over the past day.  Patient states that he often gets epigastric stomach pain and ulcers which are from his neighbors who since something down via gravity to give him ulcers and increased inflammation in his legs, otherwise patient has been in his normal health with chronic lower extremity pain but nothing more worse than usual.  Patient states abdominal pain is much better is gone to a 15 to a 6 now with pain medications given at outside hospital, patient is able to move legs denies any lower extremity pain other than chronic osteoarthritis pain in his knees and his ankles, denies any numbness or tingling any fevers, chills, night sweats, chest pain, palpitations, cough, shortness of breath, hemoptysis, dysuria, hematuria, abdominal pain, nausea/vomiting/diarrhea/constipation, melena, hematochezia, or hematemesis.      In the emergency department patient is afebrile 36.2 Celsius, pulse rate of 70, respiratory rate of 16, saturating 94% on room air, with a blood pressure  of 142/84.CBC remarkable for leukocytosis of 12,000, hemoglobin of 15.5, platelets of 209, CMP grossly unremarkable, creatinine 0.95 at baseline, no other major electrolyte abnormalities alkaline phosphatase slightly elevated above baseline 164 without other white specified transaminitis, lactic acid of 2.8 we will continue to trend, outside CT abdomen and pelvis with contrast demonstrating a 4.7 cm and 5.4 cm aortic and iliac artery aneurysms respectively thrombosed, patient evaluated by Dr. Anderson of vascular surgery, no pulses however no critical limb ischemia at this time, will be evaluated for further surgery in the a.m. we will start on heparin drip and monitor closely on telemetry in the meantime.    I discussed the plan of care with patient.    Review of Systems  Review of Systems   Constitutional:  Negative for chills and fever.   HENT:  Negative for congestion and sore throat.    Eyes:  Negative for blurred vision, double vision and pain.   Respiratory:  Negative for cough, hemoptysis, sputum production and shortness of breath.    Cardiovascular:  Negative for chest pain, palpitations, orthopnea, claudication and leg swelling.   Gastrointestinal:  Positive for abdominal pain. Negative for blood in stool, constipation, diarrhea, heartburn, melena, nausea and vomiting.   Genitourinary:  Negative for dysuria, frequency, hematuria and urgency.   Musculoskeletal:  Positive for joint pain. Negative for back pain, falls, myalgias and neck pain.   Neurological:  Negative for dizziness, sensory change, speech change, focal weakness, weakness and headaches.   Endo/Heme/Allergies:  Negative for polydipsia. Does not bruise/bleed easily.   Psychiatric/Behavioral:  Negative for depression and hallucinations. The patient has insomnia. The patient is not nervous/anxious.        Past Medical History   has a past medical history of Anxiety, Arthritis, Muscle disorder, Osteoporosis, unspecified, Seizure (HCC), Ulcer, and  Unspecified cataract.    Surgical History   has a past surgical history that includes ankle fusion (1999); ankle arthroscopy (1992); ankle ligament reconstruction (1995); and tonsillectomy.     Family History  family history is not on file.   Family history reviewed with patient.     Social History  Tobacco: 1 pack/day, previously 1 to 2 packs/day for over 40 years, approximate 50-pack-year history  Alcohol: Denies EtOH use  Recreational drugs (illegal or prescription): Previous history of cocaine use in the 1980s, recreational marijuana use current  Employment: Not currently employed  Living Situation: Lives alone in government apartment, no kids  Recent Travel: Denies any recent travel  Primary Care Provider: Reviewed [facility] medicine in [facility]  Other (stressors, spirituality, exposures): Issues with neighbor as discussed in HPI    Allergies  Allergies   Allergen Reactions    Aspirin Unspecified     GI upset       Medications  Prior to Admission Medications   Prescriptions Last Dose Informant Patient Reported? Taking?   hydrocodone/acetaminophen (NORCO)  MG TABS  Historical Yes No   Sig: Take 1-2 Tabs by mouth every 6 hours as needed.   Patient not taking: Reported on 12/8/2022      Facility-Administered Medications: None       Physical Exam  Temp:  [36.2 °C (97.1 °F)] 36.2 °C (97.1 °F)  Pulse:  [65] 65  Resp:  [17] 17  BP: (142-167)/(80-84) 142/84  SpO2:  [90 %-95 %] 95 %  Blood Pressure: (!) 142/84   Temperature: 36.2 °C (97.1 °F)   Pulse: 65   Respiration: 17   Pulse Oximetry: 95 %       Physical Exam  General: Well developed, well nourished male, in no distress.  HEENT: NC/AT, PERRL, EOMI, no scleral icterus or conjunctival pallor, fair dentition, no nasal discharge or oral erythema or exudates.   Neck: Supple, No cervical or supraclavicular LAD  CV:RRR, no murmurs gallops or Rubs, no JVD  Pulm: LCAB, no crackles, rales, rhonchi, or wheezing  GI: Normal bowel sounds, abdomen soft, nontender,  "nondistended to deep or light palpation in all 4 quadrants, no HSM.  MSK: Radial and dorsalis pedis pulses 0+ and equal bilaterally, respectively.  Popliteal pulse absent, no mottling, strength 5 out of 5 in upper and lower extremities both proximally and distally.  No lower extremity edema  Neuro: Patient is alert and oriented x3, no focal deficits, cranial nerves II through XII intact  Psych: Appropriate mood and affect, some delusions resolving neighbors and health issues    Laboratory:  Recent Labs     11/19/23 1915 11/20/23  0001   WBC  --  12.0*   RBC 5.03 5.17   HEMOGLOBIN 14.7 15.5   HEMATOCRIT 42.6 44.2   MCV 84.7 85.5   MCH 29.3 30.0   MCHC 34.5 35.1   RDW 14.7* 43.8   PLATELETCT 203 209   MPV 7.1 9.3     Recent Labs     11/20/23  0001   SODIUM 136   POTASSIUM 4.1   CHLORIDE 100   CO2 20   GLUCOSE 126*   BUN 11   CREATININE 0.95   CALCIUM 9.4     Recent Labs     11/20/23  0001   ALTSGPT 11   ASTSGOT 14   ALKPHOSPHAT 164*   TBILIRUBIN 0.4   GLUCOSE 126*         No results for input(s): \"NTPROBNP\" in the last 72 hours.      No results for input(s): \"TROPONINT\" in the last 72 hours.    Imaging:  OUTSIDE IMAGES-DX CHEST   Final Result      OUTSIDE IMAGES-CT CHEST   Final Result      OUTSIDE IMAGES-CT ABDOMEN /PELVIS   Final Result            Assessment/Plan:  Problem Representation:       * Iliac artery occlusion (HCC)- (present on admission)  Assessment & Plan  CT abdomen and pelvis with contrast demonstrating a 4.7 cm and 5.4 cm aortic and iliac artery aneurysms respectively thrombosed, patient evaluated by Dr. Anderson of vascular surgery, no pulses however no critical limb ischemia at this time, will be evaluated for further surgery in the a.m., recommend starting heparin gtt. and will be evaluated by Dr. Lunsford in the a.m. per Dr. Anderson's note  - Admit to telemetry  - Heparin GTT  - Pulse checks  - N.p.o. at midnight  - Pepcid for peptic ulcer prophylaxis  - Continue to monitor CBC in a.m.  -Discussed " importance of smoking cessation  - Multimodal pain control  - Appreciate the assistance of vascular surgery, consulted by the emergency department    Abdominal aortic aneurysm (AAA) without rupture (HCC)- (present on admission)  Assessment & Plan  C iliac artery occlusion as above, has thrombosed abdominal aortic aneurysm.    Tobacco use disorder- (present on admission)  Assessment & Plan  Greater than 50-pack-year history of tobacco use, currently down to 1 pack/day from 2 to 1-1/2 packs/day  - Spent at least 4 minutes discussing smoking cessation including nicotine replacement therapy, pharmacotherapy, and [place] telemetry quit  - Discussed pharmacotherapy  - Encourage smoking cessation, patient will follow-up with PCP as well after discharge  - Patient inquired about hypnotherapy which could be a viable method of smoking control if he so chooses  -Nicotine patch ordered    Delusions (HCC)- (present on admission)  Assessment & Plan  Patient denies previous psych history, denies any anxiety depression SI or HI, denying any hallucinations, very pleasant overall individual.  Patient discussing delusions that his neighbor upstairs at his apartment was causing his health issues through unknown means and was a hacker as well.  - Continue to monitor clinically  -Would likely avoid any Chantix or Wellbutrin for smoking cessation    Leukocytosis- (present on admission)  Assessment & Plan  Mild leukocytosis of 12,000 likely reactive denies any fevers, any signs or symptoms concerning for acute infection though thrombophlebitis could be on differential given aortic artery occlusion  - Continue monitor clinically and on CBC    Lactic acidosis- (present on admission)  Assessment & Plan  Mild lactic acidosis likely secondary to aortic aneurysm thrombosis and iliac artery occlusion, no critical limb ischemia at this time, patient could also be dehydrated as has not had anything to eat or drink since early this a.m.  - Continue  to monitor on repeat lactic acid  - Maintenance LR    Knee osteoarthritis- (present on admission)  Assessment & Plan  History of right knee osteoarthritis  - Pain management as per aortic artery occlusion        I anticipate this patient will require at least two midnights for appropriate medical management, necessitating inpatient admission because likely need for procedure with vascular surgery in the next 1 to 2 days    Patient will need a Telemetry bed on medical service .  The need is secondary to close monitoring given large nature of aortic occlusions without critical limb ischemia.    FEN: N.p.o. at midnight, maintenance fluid  mL/h, replete lites as needed  GI: Pepcid given heparin and history of GI ulcers?  VTE prophylaxis: therapeutic anticoagulation with heparin  CODE STATUS: Full code

## 2023-11-20 NOTE — ASSESSMENT & PLAN NOTE
Greater than 50-pack-year history of tobacco use, currently down to 1 pack/day from 2 to 1-1/2 packs/day  - Spent at least 4 minutes discussing smoking cessation including nicotine replacement therapy, pharmacotherapy, and [place] telemetry quit  - Discussed pharmacotherapy  - Encourage smoking cessation, patient will follow-up with PCP as well after discharge  - Patient inquired about hypnotherapy which could be a viable method of smoking control if he so chooses  -Nicotine patch ordered

## 2023-11-21 ENCOUNTER — APPOINTMENT (OUTPATIENT)
Dept: CARDIOLOGY | Facility: MEDICAL CENTER | Age: 63
DRG: 300 | End: 2023-11-21
Attending: STUDENT IN AN ORGANIZED HEALTH CARE EDUCATION/TRAINING PROGRAM
Payer: MEDICARE

## 2023-11-21 PROBLEM — I10 HTN (HYPERTENSION): Status: ACTIVE | Noted: 2023-11-21

## 2023-11-21 LAB
CHOLEST SERPL-MCNC: 204 MG/DL (ref 100–199)
ERYTHROCYTE [DISTWIDTH] IN BLOOD BY AUTOMATED COUNT: 43.3 FL (ref 35.9–50)
HCT VFR BLD AUTO: 43.6 % (ref 42–52)
HDLC SERPL-MCNC: 45 MG/DL
HGB BLD-MCNC: 14.8 G/DL (ref 14–18)
LDLC SERPL CALC-MCNC: 140 MG/DL
LV EJECT FRACT  99904: 45
LV EJECT FRACT MOD 2C 99903: 44.58
LV EJECT FRACT MOD 4C 99902: 45.42
LV EJECT FRACT MOD BP 99901: 43.99
MCH RBC QN AUTO: 28.9 PG (ref 27–33)
MCHC RBC AUTO-ENTMCNC: 33.9 G/DL (ref 32.3–36.5)
MCV RBC AUTO: 85.2 FL (ref 81.4–97.8)
PLATELET # BLD AUTO: 187 K/UL (ref 164–446)
PMV BLD AUTO: 9.2 FL (ref 9–12.9)
RBC # BLD AUTO: 5.12 M/UL (ref 4.7–6.1)
TRIGL SERPL-MCNC: 97 MG/DL (ref 0–149)
UFH PPP CHRO-ACNC: 0.36 IU/ML
WBC # BLD AUTO: 9.9 K/UL (ref 4.8–10.8)

## 2023-11-21 PROCEDURE — A9270 NON-COVERED ITEM OR SERVICE: HCPCS | Performed by: STUDENT IN AN ORGANIZED HEALTH CARE EDUCATION/TRAINING PROGRAM

## 2023-11-21 PROCEDURE — 85520 HEPARIN ASSAY: CPT

## 2023-11-21 PROCEDURE — 93306 TTE W/DOPPLER COMPLETE: CPT

## 2023-11-21 PROCEDURE — 36415 COLL VENOUS BLD VENIPUNCTURE: CPT

## 2023-11-21 PROCEDURE — 700102 HCHG RX REV CODE 250 W/ 637 OVERRIDE(OP): Performed by: STUDENT IN AN ORGANIZED HEALTH CARE EDUCATION/TRAINING PROGRAM

## 2023-11-21 PROCEDURE — 770001 HCHG ROOM/CARE - MED/SURG/GYN PRIV*

## 2023-11-21 PROCEDURE — 99232 SBSQ HOSP IP/OBS MODERATE 35: CPT | Performed by: INTERNAL MEDICINE

## 2023-11-21 PROCEDURE — 93306 TTE W/DOPPLER COMPLETE: CPT | Mod: 26 | Performed by: INTERNAL MEDICINE

## 2023-11-21 PROCEDURE — 99231 SBSQ HOSP IP/OBS SF/LOW 25: CPT | Performed by: SURGERY

## 2023-11-21 PROCEDURE — 700105 HCHG RX REV CODE 258: Performed by: STUDENT IN AN ORGANIZED HEALTH CARE EDUCATION/TRAINING PROGRAM

## 2023-11-21 PROCEDURE — 80061 LIPID PANEL: CPT

## 2023-11-21 PROCEDURE — 700102 HCHG RX REV CODE 250 W/ 637 OVERRIDE(OP): Performed by: INTERNAL MEDICINE

## 2023-11-21 PROCEDURE — 85027 COMPLETE CBC AUTOMATED: CPT

## 2023-11-21 PROCEDURE — A9270 NON-COVERED ITEM OR SERVICE: HCPCS | Performed by: INTERNAL MEDICINE

## 2023-11-21 RX ORDER — LISINOPRIL 10 MG/1
10 TABLET ORAL
Status: DISCONTINUED | OUTPATIENT
Start: 2023-11-21 | End: 2023-11-22 | Stop reason: HOSPADM

## 2023-11-21 RX ORDER — LABETALOL HYDROCHLORIDE 5 MG/ML
10 INJECTION, SOLUTION INTRAVENOUS EVERY 6 HOURS PRN
Status: DISCONTINUED | OUTPATIENT
Start: 2023-11-21 | End: 2023-11-22 | Stop reason: HOSPADM

## 2023-11-21 RX ADMIN — DOCUSATE SODIUM 50 MG AND SENNOSIDES 8.6 MG 2 TABLET: 8.6; 5 TABLET, FILM COATED ORAL at 16:04

## 2023-11-21 RX ADMIN — OXYCODONE 5 MG: 5 TABLET ORAL at 19:43

## 2023-11-21 RX ADMIN — SODIUM CHLORIDE, POTASSIUM CHLORIDE, SODIUM LACTATE AND CALCIUM CHLORIDE: 600; 310; 30; 20 INJECTION, SOLUTION INTRAVENOUS at 08:50

## 2023-11-21 RX ADMIN — AMLODIPINE BESYLATE 10 MG: 10 TABLET ORAL at 05:25

## 2023-11-21 RX ADMIN — LISINOPRIL 10 MG: 10 TABLET ORAL at 08:24

## 2023-11-21 RX ADMIN — DOCUSATE SODIUM 50 MG AND SENNOSIDES 8.6 MG 2 TABLET: 8.6; 5 TABLET, FILM COATED ORAL at 05:25

## 2023-11-21 RX ADMIN — FAMOTIDINE 20 MG: 20 TABLET ORAL at 16:04

## 2023-11-21 RX ADMIN — FAMOTIDINE 20 MG: 20 TABLET ORAL at 05:25

## 2023-11-21 RX ADMIN — POLYETHYLENE GLYCOL 3350 1 PACKET: 17 POWDER, FOR SOLUTION ORAL at 21:32

## 2023-11-21 RX ADMIN — NICOTINE TRANSDERMAL SYSTEM 21 MG: 21 PATCH, EXTENDED RELEASE TRANSDERMAL at 05:26

## 2023-11-21 ASSESSMENT — COGNITIVE AND FUNCTIONAL STATUS - GENERAL
SUGGESTED CMS G CODE MODIFIER MOBILITY: CH
DAILY ACTIVITIY SCORE: 24
SUGGESTED CMS G CODE MODIFIER DAILY ACTIVITY: CH
MOBILITY SCORE: 24

## 2023-11-21 ASSESSMENT — FIBROSIS 4 INDEX
FIB4 SCORE: 1.3
FIB4 SCORE: 1.3

## 2023-11-21 ASSESSMENT — PAIN DESCRIPTION - PAIN TYPE
TYPE: ACUTE PAIN
TYPE: ACUTE PAIN

## 2023-11-21 NOTE — CARE PLAN
The patient is Stable - Low risk of patient condition declining or worsening    Shift Goals  Clinical Goals: heparin gtt, pain management, POC  Patient Goals: pain mangement  Family Goals: MARCUS    Progress made toward(s) clinical / shift goals:    Problem: Pain - Standard  Goal: Alleviation of pain or a reduction in pain to the patient’s comfort goal  Description: Target End Date:  Prior to discharge or change in level of care    Document on Vitals flowsheet    1.  Document pain using the appropriate pain scale per order or unit policy  2.  Educate and implement non-pharmacologic comfort measures (i.e. relaxation, distraction, massage, cold/heat therapy, etc.)  3.  Pain management medications as ordered  4.  Reassess pain after pain med administration per policy  5.  If opiods administered assess patient's response to pain medication is appropriate per POSS sedation scale  6.  Follow pain management plan developed in collaboration with patient and interdisciplinary team (including palliative care or pain specialists if applicable)  Outcome: Progressing  Note: Pt complaints of 6/10 sharp pain in mid anterior abdomen. See MAR for implemented pharmacological management. Pt also offered saltine cracker. Upon reassessment pt is calm, sleeping, with unlabored breathing.     Problem: Fall Risk  Goal: Patient will remain free from falls  Description: Target End Date:  Prior to discharge or change in level of care    Document interventions on the [place] Fall Risk Assessment    1.  Assess for fall risk factors  2.  Implement fall precautions  Outcome: Progressing  Note: Pt is a moderate fall risk. Bed is in lowest, locked position, upper bed rails in place. Call light and belongings within reach. Bed alarm on and treaded socks on the pt.

## 2023-11-21 NOTE — PROGRESS NOTES
Hospital Medicine Daily Progress Note    Date of Service  11/21/2023    Chief Complaint  Andrea Polanco Jr. is a 63 y.o. male admitted 11/19/2023 with abdominal pain     Hospital Course  This is a a 63 y.o. male with a past medical history significant for tobacco use, glaucoma, cataracts, gastric ulcers?,  Osteoarthritis of the right knee, left ankle tendon rupture status postsurgical repair, seizures noted in chart 1985 last patient denies,  who presented 11/19/2023 as a transfer from [facility] emergency department with concern for a 1 day history of a pulling right sided abdominal pain starting on right flank then moving towards midline   outside CT abdomen and pelvis with contrast demonstrating a 4.7 cm and 5.4 cm aortic and iliac artery aneurysms respectively thrombosed,   Vascular surgery consulted and patient admitted for further work up and treatment    Interval Problem Update  Patient seen and examined, on heparin drp vascular surgery following appreciate rec.  No nausea or vomiting.  HTN not well controlled with SBP >170 adding lisnopril to his regimen today     I have discussed this patient's plan of care and discharge plan at IDT rounds today with Case Management, Nursing, Nursing leadership, and other members of the IDT team.    Consultants/Specialty  vascular surgery    Code Status  Full Code    Disposition  The patient is not medically cleared for discharge to home or a post-acute facility.      I have placed the appropriate orders for post-discharge needs.    Review of Systems  ROS     Physical Exam  Temp:  [36.4 °C (97.5 °F)-37.5 °C (99.5 °F)] 36.9 °C (98.5 °F)  Pulse:  [] 96  Resp:  [16-18] 16  BP: (130-183)/() 130/94  SpO2:  [93 %-97 %] 93 %    Physical Exam    Fluids    Intake/Output Summary (Last 24 hours) at 11/21/2023 1427  Last data filed at 11/21/2023 0523  Gross per 24 hour   Intake 1768.64 ml   Output --   Net 1768.64 ml       Laboratory  Recent Labs      11/19/23  1915 11/20/23  0001 11/21/23  0306   WBC  --  12.0* 9.9   RBC 5.03 5.17 5.12   HEMOGLOBIN 14.7 15.5 14.8   HEMATOCRIT 42.6 44.2 43.6   MCV 84.7 85.5 85.2   MCH 29.3 30.0 28.9   MCHC 34.5 35.1 33.9   RDW 14.7* 43.8 43.3   PLATELETCT 203 209 187   MPV 7.1 9.3 9.2     Recent Labs     11/20/23  0001 11/20/23  0754   SODIUM 136 136   POTASSIUM 4.1 3.8   CHLORIDE 100 102   CO2 20 23   GLUCOSE 126* 109*   BUN 11 11   CREATININE 0.95 0.83   CALCIUM 9.4 9.1     Recent Labs     11/20/23  0001   APTT 148.1*   INR 1.22*         Recent Labs     11/21/23  0306   TRIGLYCERIDE 97   HDL 45   *       Imaging  EC-ECHOCARDIOGRAM COMPLETE W/O CONT   Final Result      US-ASHLEY SINGLE LEVEL BILAT   Final Result      OUTSIDE IMAGES-DX CHEST   Final Result      OUTSIDE IMAGES-CT CHEST   Final Result      OUTSIDE IMAGES-CT ABDOMEN /PELVIS   Final Result           Assessment/Plan  * Iliac artery occlusion (HCC)- (present on admission)  Assessment & Plan  CT abdomen and pelvis with contrast demonstrating a 4.7 cm and 5.4 cm aortic and iliac artery aneurysms respectively thrombosed,  Vascular surgery following appreicate rec,  On heparin drip  ASHLEY showing moderately reduced  ankle  brachial index     HTN (hypertension)  Assessment & Plan  Was started on amlodipine  I also added lisinopril to his regimen since BP not well controlled     Tobacco use disorder- (present on admission)  Assessment & Plan  Greater than 50-pack-year history of tobacco use, currently down to 1 pack/day from 2 to 1-1/2 packs/day  - Spent at least 4 minutes discussing smoking cessation including nicotine replacement therapy, pharmacotherapy, and [place] telemetry quit  - Discussed pharmacotherapy  - Encourage smoking cessation, patient will follow-up with PCP as well after discharge  - Patient inquired about hypnotherapy which could be a viable method of smoking control if he so chooses  -Nicotine patch ordered    Abdominal aortic aneurysm (AAA) without  rupture (HCC)- (present on admission)  Assessment & Plan  C iliac artery occlusion as above, has thrombosed abdominal aortic aneurysm.    Delusions (HCC)- (present on admission)  Assessment & Plan  Patient denies previous psych history, denies any anxiety depression SI or HI, denying any hallucinations, very pleasant overall individual.  Patient discussing delusions that his neighbor upstairs at his apartment was causing his health issues through unknown means and was a hacker as well.  - Continue to monitor clinically  -Would likely avoid any Chantix or Wellbutrin for smoking cessation    Leukocytosis- (present on admission)  Assessment & Plan  Mild leukocytosis of 12,000 likely reactive denies any fevers, any signs or symptoms concerning for acute infection though thrombophlebitis could be on differential given aortic artery occlusion  - Continue monitor clinically and on CBC    -trending down     Lactic acidosis- (present on admission)  Assessment & Plan  Mild lactic acidosis likely secondary to aortic aneurysm thrombosis and iliac artery occlusion, no critical limb ischemia at this time, patient could also be dehydrated as has not had anything to eat or drink since early this a.m.  - Continue to monitor on repeat lactic acid  - Maintenance LR    -resolved      Knee osteoarthritis- (present on admission)  Assessment & Plan  History of right knee osteoarthritis  - Pain management as per aortic artery occlusion         VTE prophylaxis: Heparin drip     I have performed a physical exam and reviewed and updated ROS and Plan today (11/21/2023). In review of yesterday's note (11/20/2023), there are no changes except as documented above.

## 2023-11-21 NOTE — PROGRESS NOTES
Bedside report received from day RN, pt care assumed, assessment completed. Pt is A&Ox4, pain 0/10. Pt is medical. Updated on POC, questions answered. Bed in lowest, locked position, treaded socks on, call light and belongings within reach.

## 2023-11-21 NOTE — PROGRESS NOTES
Assumed care of patient. Denies shortness of breath but endorses small amount of back pain he believes is related to the bed. Pt repositioned self. Updated patient on plan of care and updated white board. All needs met at this time and fall precautions in place. Will continue to monitor.

## 2023-11-22 ENCOUNTER — PHARMACY VISIT (OUTPATIENT)
Dept: PHARMACY | Facility: MEDICAL CENTER | Age: 63
End: 2023-11-22
Payer: COMMERCIAL

## 2023-11-22 VITALS
RESPIRATION RATE: 18 BRPM | OXYGEN SATURATION: 90 % | TEMPERATURE: 98.8 F | BODY MASS INDEX: 23.31 KG/M2 | SYSTOLIC BLOOD PRESSURE: 139 MMHG | WEIGHT: 157.41 LBS | HEIGHT: 69 IN | HEART RATE: 98 BPM | DIASTOLIC BLOOD PRESSURE: 90 MMHG

## 2023-11-22 LAB
ANION GAP SERPL CALC-SCNC: 11 MMOL/L (ref 7–16)
BUN SERPL-MCNC: 13 MG/DL (ref 8–22)
CALCIUM SERPL-MCNC: 9.3 MG/DL (ref 8.5–10.5)
CHLORIDE SERPL-SCNC: 99 MMOL/L (ref 96–112)
CO2 SERPL-SCNC: 23 MMOL/L (ref 20–33)
CREAT SERPL-MCNC: 0.96 MG/DL (ref 0.5–1.4)
ERYTHROCYTE [DISTWIDTH] IN BLOOD BY AUTOMATED COUNT: 44 FL (ref 35.9–50)
GFR SERPLBLD CREATININE-BSD FMLA CKD-EPI: 90 ML/MIN/1.73 M 2
GLUCOSE SERPL-MCNC: 101 MG/DL (ref 65–99)
HCT VFR BLD AUTO: 43.1 % (ref 42–52)
HGB BLD-MCNC: 14.6 G/DL (ref 14–18)
MCH RBC QN AUTO: 29.1 PG (ref 27–33)
MCHC RBC AUTO-ENTMCNC: 33.9 G/DL (ref 32.3–36.5)
MCV RBC AUTO: 85.9 FL (ref 81.4–97.8)
PLATELET # BLD AUTO: 170 K/UL (ref 164–446)
PMV BLD AUTO: 9 FL (ref 9–12.9)
POTASSIUM SERPL-SCNC: 3.9 MMOL/L (ref 3.6–5.5)
RBC # BLD AUTO: 5.02 M/UL (ref 4.7–6.1)
SODIUM SERPL-SCNC: 133 MMOL/L (ref 135–145)
UFH PPP CHRO-ACNC: <0.1 IU/ML
WBC # BLD AUTO: 9.5 K/UL (ref 4.8–10.8)

## 2023-11-22 PROCEDURE — A9270 NON-COVERED ITEM OR SERVICE: HCPCS | Performed by: STUDENT IN AN ORGANIZED HEALTH CARE EDUCATION/TRAINING PROGRAM

## 2023-11-22 PROCEDURE — 700102 HCHG RX REV CODE 250 W/ 637 OVERRIDE(OP): Performed by: STUDENT IN AN ORGANIZED HEALTH CARE EDUCATION/TRAINING PROGRAM

## 2023-11-22 PROCEDURE — RXMED WILLOW AMBULATORY MEDICATION CHARGE: Performed by: INTERNAL MEDICINE

## 2023-11-22 PROCEDURE — 85027 COMPLETE CBC AUTOMATED: CPT

## 2023-11-22 PROCEDURE — 36415 COLL VENOUS BLD VENIPUNCTURE: CPT

## 2023-11-22 PROCEDURE — 99239 HOSP IP/OBS DSCHRG MGMT >30: CPT | Performed by: INTERNAL MEDICINE

## 2023-11-22 PROCEDURE — A9270 NON-COVERED ITEM OR SERVICE: HCPCS | Performed by: INTERNAL MEDICINE

## 2023-11-22 PROCEDURE — 85520 HEPARIN ASSAY: CPT

## 2023-11-22 PROCEDURE — 700102 HCHG RX REV CODE 250 W/ 637 OVERRIDE(OP): Performed by: INTERNAL MEDICINE

## 2023-11-22 PROCEDURE — 80048 BASIC METABOLIC PNL TOTAL CA: CPT

## 2023-11-22 RX ORDER — FAMOTIDINE 20 MG/1
20 TABLET, FILM COATED ORAL 2 TIMES DAILY
Qty: 60 TABLET | Refills: 0 | Status: SHIPPED | OUTPATIENT
Start: 2023-11-22

## 2023-11-22 RX ORDER — AMLODIPINE BESYLATE 10 MG/1
10 TABLET ORAL DAILY
Qty: 30 TABLET | Refills: 0 | Status: SHIPPED | OUTPATIENT
Start: 2023-11-23

## 2023-11-22 RX ORDER — LISINOPRIL 10 MG/1
10 TABLET ORAL DAILY
Qty: 30 TABLET | Refills: 0 | Status: SHIPPED | OUTPATIENT
Start: 2023-11-23

## 2023-11-22 RX ADMIN — DOCUSATE SODIUM 50 MG AND SENNOSIDES 8.6 MG 2 TABLET: 8.6; 5 TABLET, FILM COATED ORAL at 05:43

## 2023-11-22 RX ADMIN — AMLODIPINE BESYLATE 10 MG: 10 TABLET ORAL at 05:43

## 2023-11-22 RX ADMIN — FAMOTIDINE 20 MG: 20 TABLET ORAL at 05:43

## 2023-11-22 RX ADMIN — LISINOPRIL 10 MG: 10 TABLET ORAL at 05:43

## 2023-11-22 RX ADMIN — NICOTINE TRANSDERMAL SYSTEM 21 MG: 21 PATCH, EXTENDED RELEASE TRANSDERMAL at 05:43

## 2023-11-22 ASSESSMENT — PAIN DESCRIPTION - PAIN TYPE
TYPE: ACUTE PAIN
TYPE: ACUTE PAIN

## 2023-11-22 NOTE — PROGRESS NOTES
Discharging Patient home per physician order.  Discharged with [facility].  Demonstrated understanding of discharge instructions, follow up appointments, home medications, prescriptions.  Ambulating without assistance via Single point Cane, voiding without difficulty, pain well controlled, tolerating oral medications, oxygen saturation greater than 90% , tolerating diet. Educational handouts given and discussed.  Verbalized understanding of discharge instructions and educational handouts.  Stated several reasons why to return to ED or seek medical attention. All questions answered.  Belongings and dressing supplies with patient at time of discharge.    Meds to Beds Delivered to Patient, Patient Verbalized Understanding on how to contact Isatu HAIRSTON's office for F/u Procedure Scheduling

## 2023-11-22 NOTE — CARE PLAN
Problem: Pain - Standard  Goal: Alleviation of pain or a reduction in pain to the patient’s comfort goal  Outcome: Progressing     Problem: Knowledge Deficit - Standard  Goal: Patient and family/care givers will demonstrate understanding of plan of care, disease process/condition, diagnostic tests and medications  Outcome: Progressing     Problem: Fall Risk  Goal: Patient will remain free from falls  Outcome: Progressing   The patient is Stable - Low risk of patient condition declining or worsening    Shift Goals  Clinical Goals: Discharge  Patient Goals: Discharge  Family Goals: N/A    Progress made toward(s) clinical / shift goals:  Pain medicated per MAR, Educated patient on plan of care this shift, patient free from falls       Patient is not progressing towards the following goals:

## 2023-11-22 NOTE — DISCHARGE SUMMARY
Discharge Summary    CHIEF COMPLAINT ON ADMISSION  Chief Complaint   Patient presents with    Abdominal Pain     Flank pain        Reason for Admission  Thrombosed AAA     Admission Date  11/19/2023    CODE STATUS  Prior    HPI & HOSPITAL COURSE  This is a 63 y.o. male   with a past medical history significant for tobacco use, glaucoma, cataracts, gastric ulcers?,  Osteoarthritis of the right knee, left ankle tendon rupture status postsurgical repair, seizures noted in chart 1985 last patient denies,  who presented 11/19/2023 as a transfer from [facility] [place] emergency department with concern for a 1 day history of a pulling right sided abdominal pain starting on right flank then moving towards midline   outside CT abdomen and pelvis with contrast demonstrating a 4.7 cm and 5.4 cm aortic and iliac artery aneurysms respectively thrombosed,   Vascular surgery consulted and evaluated patient. He had ASHLEY done which showed moderate brachial-ankle index per vascular surgery no need for urgent intervention and will need to follow up as outpatient.  His HTN was not controlled initially so started on amlodipine and lisinopril now doing better   Will discharge patient home today     The patient met 2-midnight criteria for an inpatient stay at the time of discharge.    Discharge Date  11/22/2023    FOLLOW UP ITEMS POST DISCHARGE  Vascular surgery   PCP     DISCHARGE DIAGNOSES  Principal Problem:    Iliac artery occlusion (HCC) (POA: Yes)  Active Problems:    Knee osteoarthritis (POA: Yes)    Lactic acidosis (POA: Yes)    Leukocytosis (POA: Yes)    Delusions (HCC) (POA: Yes)    Abdominal aortic aneurysm (AAA) without rupture (HCC) (POA: Yes)    Tobacco use disorder (POA: Yes)    HTN (hypertension) (POA: Unknown)  Resolved Problems:    * No resolved hospital problems. *      FOLLOW UP  Future Appointments   Date Time Provider Department Center   11/30/2023 11:45 AM Ady Lunsford M.D. [facility] None     Ady Lunsford,  M.D.  [address] [facility] [address]  [place] [address]  [phone]    Schedule an appointment as soon as possible for a visit in 1 week(s)  For OP Surgery Scheduling      MEDICATIONS ON DISCHARGE     Medication List        START taking these medications        Instructions   amLODIPine 10 MG Tabs  Start taking on: November 23, 2023  Commonly known as: Norvasc   Take 1 Tablet by mouth every day.  Dose: 10 mg     famotidine 20 MG Tabs  Commonly known as: Pepcid   Take 1 Tablet by mouth 2 times a day.  Dose: 20 mg     lisinopril 10 MG Tabs  Start taking on: November 23, 2023  Commonly known as: Prinivil   Take 1 Tablet by mouth every day.  Dose: 10 mg              Allergies  Allergies   Allergen Reactions    Aspirin Unspecified     Stomach ulcers, GI upset       DIET  No orders of the defined types were placed in this encounter.      ACTIVITY  As tolerated.  Weight bearing as tolerated    CONSULTATIONS  Vascular surgery     PROCEDURES  None     LABORATORY  Lab Results   Component Value Date    SODIUM 133 (L) 11/22/2023    POTASSIUM 3.9 11/22/2023    CHLORIDE 99 11/22/2023    CO2 23 11/22/2023    GLUCOSE 101 (H) 11/22/2023    BUN 13 11/22/2023    CREATININE 0.96 11/22/2023    CREATININE 0.8 05/13/2009    GLOMRATE 87 04/03/2023        Lab Results   Component Value Date    WBC 9.5 11/22/2023    HEMOGLOBIN 14.6 11/22/2023    HEMATOCRIT 43.1 11/22/2023    PLATELETCT 170 11/22/2023        Total time of the discharge process exceeds 35 minutes.

## 2023-11-22 NOTE — PROGRESS NOTES
4 Eyes Skin Assessment Completed by Masoud RN and EKTA Aranda.    Head WDL  Ears WDL  Nose WDL  Mouth WDL  Neck WDL  Breast/Chest WDL  Shoulder Blades WDL  Spine WDL  (R) Arm/Elbow/Hand WDL  (L) Arm/Elbow/Hand WDL  Abdomen WDL  Groin WDL  Scrotum/Coccyx/Buttocks WDL  (R) Leg WDL  (L) Leg WDL  (R) Heel/Foot/Toe WDL  (L) Heel/Foot/Toe WDL          Devices In Places Blood Pressure Cuff and Pulse Ox, Cardiac Monitor Box       Interventions In Place Pillows, Low Air Loss Mattress, and Heels Loaded W/Pillows    Possible Skin Injury No    Pictures Uploaded Into Epic N/A  Wound Consult Placed N/A  RN Wound Prevention Protocol Ordered No

## 2023-11-22 NOTE — CARE PLAN
The patient is Stable - Low risk of patient condition declining or worsening    Shift Goals  Clinical Goals: pain control, pulse checks  Patient Goals: rest  Family Goals: MARCUS    Progress made toward(s) clinical / shift goals:  Pain medicated per MAR. Q4 pulse checked. Doppler used for pedal pulses.      Problem: Pain - Standard  Goal: Alleviation of pain or a reduction in pain to the patient’s comfort goal  Outcome: Progressing     Problem: Knowledge Deficit - Standard  Goal: Patient and family/care givers will demonstrate understanding of plan of care, disease process/condition, diagnostic tests and medications  Outcome: Progressing     Problem: Fall Risk  Goal: Patient will remain free from falls  Outcome: Progressing

## 2023-11-22 NOTE — CARE PLAN
The patient is Stable - Low risk of patient condition declining or worsening    Shift Goals  Clinical Goals: pain control, pulse checks  Patient Goals: rest  Family Goals: MARCUS    Progress made toward(s) clinical / shift goals:  Pain medicated per MAR, patient ambulatory, pulses heard by doppler.       Problem: Pain - Standard  Goal: Alleviation of pain or a reduction in pain to the patient’s comfort goal  11/22/2023 0732 by Sima Blum R.N.  Outcome: Progressing  11/22/2023 0632 by Sima Blum R.N.  Outcome: Progressing     Problem: Knowledge Deficit - Standard  Goal: Patient and family/care givers will demonstrate understanding of plan of care, disease process/condition, diagnostic tests and medications  11/22/2023 0732 by Sima Blum R.N.  Outcome: Progressing  11/22/2023 0632 by Sima Blum R.N.  Outcome: Progressing     Problem: Fall Risk  Goal: Patient will remain free from falls  11/22/2023 0732 by Sima Blum R.N.  Outcome: Progressing  11/22/2023 0632 by Sima Blum R.N.  Outcome: Progressing

## 2023-11-22 NOTE — PROGRESS NOTES
VASCULAR SURGERY SERVICE                        Progress Note  _________________________________    ASHLEY 0.55 bilaterally which is a mild-moderate reduction and does not constitute critical limb ischemia    His ECHO shows EF 45-50% and normal LV function    Discontinued heparin and IVF. No anticoagulation needed    Patient can be discharged and follow-up with me outpatient. Patient strongly needs to consider smoking cessation prior to any revascularization procedure.    I provided my clinic information for him to arrange follow-up    Ady Lunsford MD  [facility]   Voalte preferred or call my office [phone]  __________________________________________________  Patient:Andrea Newberrytian Mahajan.   MRN:[id number]

## 2023-11-22 NOTE — PROGRESS NOTES
"Admitted to room T 434 via transport in [facility] from T8 at 1700.  BP (!) 149/102   Pulse 96   Temp 36.3 °C (97.3 °F)   Resp 16   Ht 1.753 m (5' 9\")   Wt 71.4 kg (157 lb 6.5 oz)   SpO2 96%   BMI 23.25 kg/m²       Patient reports pain at 0 on a scale of 0-10. Educated patient regarding pharmacologic and non pharmacologic modalities for pain management. Oriented to room call light and smoking policy.  Reviewed plan of care (equipment, incentive spirometer, sequential compression devices, medications, activity, diet, fall precautions, skin care, and pain) with patient and family. Welcome packet given and reviewed with patient, all questions answered. Education provided on oral hygiene program.    AA&Ox4. Denies CP/SOB.  See 2 RN skin note  Tolerating Cardiac diet. Denies N/V.  + void. Last BM 11/18  Pt ambulates SBA w Single Point Cane.  All needs met at this time. Call light within reach. Pt calls appropriately. Bed low and locked, non skid socks in place. Hourly rounding in place.    "

## 2023-11-22 NOTE — DISCHARGE PLANNING
Case Management Discharge Planning    Admission Date: 11/19/2023  GMLOS: 3.1  ALOS: 2    6-Clicks ADL Score: 24  6-Clicks Mobility Score: 24      Anticipated Discharge Dispo:      Action(s) Taken: Chart review completed. Patient discussed during IDT rounds. Patient is MC to be discharged to home residence. Medical [employer] arranged via EKTA Hicks CM Supervisor    Escalations Completed: None    Medically Clear: Yes

## 2023-11-22 NOTE — DISCHARGE INSTRUCTIONS
Discharge Instructions    Discharged to home by medical transportation with escort. Discharged via wheelchair, hospital escort: Yes.  Special equipment needed: Not Applicable    Be sure to schedule a follow-up appointment with your primary care doctor or any specialists as instructed.     Discharge Plan:   Influenza Vaccine Indication: Patient Refuses    I understand that a diet low in cholesterol, fat, and sodium is recommended for good health. Unless I have been given specific instructions below for another diet, I accept this instruction as my diet prescription.   Other diet:     Special Instructions: None    -Is this patient being discharged with medication to prevent blood clots?  No    Is patient discharged on Warfarin / Coumadin?   No

## 2023-11-22 NOTE — PROGRESS NOTES
Bedside report received. Assessment complete.  A&Ox4. Denies CP/SOB.  Denies pain.  Q4 pulses. Bilateral pedal pulses heard by doppler  Skin per flowsheet.  Tolerating GI soft diet.   Denies N/V at this time.   + void. Last BM 11/2   Pt ambulates independently.  All needs met at this time. Call light within reach. Pt calls appropriately.   Bed low and locked, non skid socks in place. Hourly rounding in place.

## 2023-11-22 NOTE — CARE PLAN
Problem: Pain - Standard  Goal: Alleviation of pain or a reduction in pain to the patient’s comfort goal  Outcome: Progressing     Problem: Knowledge Deficit - Standard  Goal: Patient and family/care givers will demonstrate understanding of plan of care, disease process/condition, diagnostic tests and medications  Outcome: Progressing     Problem: Fall Risk  Goal: Patient will remain free from falls  Outcome: Progressing   The patient is Stable - Low risk of patient condition declining or worsening    Shift Goals  Clinical Goals: Admission  Patient Goals: Comfort  Family Goals: N/A    Progress made toward(s) clinical / shift goals:  Pain medicated per MAR, Educated patient on plan of care this shift, Patient free from falls at this time     Patient is not progressing towards the following goals:

## 2023-11-22 NOTE — THERAPY
"Occupational Therapy Contact Note    Patient Name: Andrea Polanco Jr.  Age:  63 y.o., Sex:  male  Medical Record #: [id number]  Today's Date: 11/21/2023    Discussed missed therapy with RN       11/21/23 6883   Interdisciplinary Plan of Care Collaboration   Collaboration Comments OT referral received. Pt with LE ischemia, on Heparin drip. APPT and Heparin xa not yet within therapeutic range per OT/PT protocol. Additionally, pt is declining mobilization due to ulcers \"acting up\". Will attempt OT eval again.       "

## 2023-11-22 NOTE — DISCHARGE PLANNING
Patient needed assistance with a ride home.  RN CM arranged an Uber ride for the patient, trip ID [id number].

## 2023-11-30 ENCOUNTER — TELEPHONE (OUTPATIENT)
Dept: VASCULAR LAB | Facility: MEDICAL CENTER | Age: 63
End: 2023-11-30

## 2023-11-30 ENCOUNTER — OFFICE VISIT (OUTPATIENT)
Dept: VASCULAR SURGERY | Facility: MEDICAL CENTER | Age: 63
End: 2023-11-30
Payer: MEDICARE

## 2023-11-30 ENCOUNTER — HOSPITAL ENCOUNTER (OUTPATIENT)
Facility: MEDICAL CENTER | Age: 63
End: 2023-11-30
Attending: SURGERY | Admitting: SURGERY
Payer: MEDICARE

## 2023-11-30 VITALS
DIASTOLIC BLOOD PRESSURE: 62 MMHG | HEIGHT: 69 IN | OXYGEN SATURATION: 99 % | HEART RATE: 89 BPM | TEMPERATURE: 98.1 F | SYSTOLIC BLOOD PRESSURE: 104 MMHG | WEIGHT: 157 LBS | BODY MASS INDEX: 23.25 KG/M2

## 2023-11-30 DIAGNOSIS — M79.606 ISCHEMIC REST PAIN OF LOWER EXTREMITY: ICD-10-CM

## 2023-11-30 DIAGNOSIS — I99.8 ISCHEMIC REST PAIN OF LOWER EXTREMITY: ICD-10-CM

## 2023-11-30 DIAGNOSIS — I77.9 PAOD (PERIPHERAL ARTERIAL OCCLUSIVE DISEASE) (HCC): ICD-10-CM

## 2023-11-30 PROCEDURE — 99214 OFFICE O/P EST MOD 30 MIN: CPT | Performed by: SURGERY

## 2023-11-30 PROCEDURE — 3074F SYST BP LT 130 MM HG: CPT | Performed by: SURGERY

## 2023-11-30 PROCEDURE — 3078F DIAST BP <80 MM HG: CPT | Performed by: SURGERY

## 2023-11-30 RX ORDER — HYDROCODONE BITARTRATE AND ACETAMINOPHEN 5; 325 MG/1; MG/1
1-2 TABLET ORAL EVERY 4 HOURS PRN
Qty: 20 TABLET | Refills: 0 | Status: SHIPPED | OUTPATIENT
Start: 2023-11-30 | End: 2023-12-05

## 2023-11-30 ASSESSMENT — FIBROSIS 4 INDEX: FIB4 SCORE: 1.44

## 2023-11-30 NOTE — TELEPHONE ENCOUNTER
I tried calling patient to try and schedule procedure with Dr. Lunsford, but the patient did not answer and there was no voicemail set up.     I called patient's mother that is on file and left a message with her for patient to call me back to schedule procedure for Monday.

## 2023-11-30 NOTE — PROGRESS NOTES
Vascular Surgery  Presurgical H&P    Patient:Andrea Polanco Jr.  MRN:[id number]  Primary care physician:Livan Hutchison M.D.    Vascular Consultant: Ady Lunsford MD    11/30/2023  _____________________________________________________    Chief Complaint/Reason for Visit:     Bilateral leg pain    History of Present Illness:   Andrea Polanco is a 63 y.o. year old male who was recently admitted to the hospital with bilateral lower extremity pain.  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  ABIs were found to be around 0.5 bilaterally and patient's symptoms appeared to be manageable.  Patient and I decided to discharge him and have him follow-up outpatient to determine if revascularization would be required.  Today the patient reports that he has been having significant walking impairment at home, even difficult to get around his house or do basic chores like laundry.  He says any ambulation outside the house such as going to the grocery store is almost impossible.  He says once the pain said and it lingers for a long period of time, sometimes several hours even after cessation of activity.    Past Medical History:     Past Medical History:   Diagnosis Date    Anxiety     Arthritis     Cataract     Chronic pain     Glaucoma     Resighini (hard of hearing)     left ear. no hearing aid per patient    Muscle disorder     Osteoporosis, unspecified     Seizure (HCC)     Ulcer     Unspecified cataract      Past Surgical History:     Past Surgical History:   Procedure Laterality Date    ANKLE FUSION  1999    ANKLE LIGAMENT RECONSTRUCTION  1995    ANKLE ARTHROSCOPY  1992    TONSILLECTOMY       Allergies:     Allergies   Allergen Reactions    Aspirin Unspecified     Stomach ulcers, GI upset     Medications:     Outpatient Encounter Medications as of 11/30/2023   Medication Sig Dispense Refill    amLODIPine (NORVASC) 10 MG Tab Take 1 Tablet by mouth every day. 30 Tablet 0    famotidine  (PEPCID) 20 MG Tab Take 1 Tablet by mouth 2 times a day. 60 Tablet 0    lisinopril (PRINIVIL) 10 MG Tab Take 1 Tablet by mouth every day. 30 Tablet 0     No facility-administered encounter medications on file as of 11/30/2023.     Social History:     Social History     Socioeconomic History    Marital status: Single     Spouse name: Not on file    Number of children: Not on file    Years of education: Not on file    Highest education level: Not on file   Occupational History    Not on file   Tobacco Use    Smoking status: Every Day     Current packs/day: 1.00     Types: Cigarettes    Smokeless tobacco: Current   Vaping Use    Vaping Use: Never used   Substance and Sexual Activity    Alcohol use: Never    Drug use: Yes     Types: Marijuana     Comment: as needed for pain    Sexual activity: Not on file   Other Topics Concern    Not on file   Social History Narrative    Not on file     Social Determinants of Health     Financial Resource Strain: Not on file   Food Insecurity: Not on file   Transportation Needs: Not on file   Physical Activity: Not on file   Stress: Not on file   Social Connections: Not on file   Intimate Partner Violence: Not on file   Housing Stability: Not on file      Social History     Tobacco Use   Smoking Status Every Day    Current packs/day: 1.00    Types: Cigarettes   Smokeless Tobacco Current     Social History     Substance and Sexual Activity   Alcohol Use Never     Social History     Substance and Sexual Activity   Drug Use Yes    Types: Marijuana    Comment: as needed for pain      Family History:   No family history on file.    Review of Systems:   Constitutional: Negative for fever or chills  HENT:   Negative for hearing loss or tinnitus    Eyes:    Negative for blurred vision or loss of vision  Respiratory:  Negative for cough or hemoptysis  Cardiac:  Negative for chest pain or palpitations  Vascular:  Positive for claudication, Positive for rest pain  Gastrointestinal: Negative for  "vomiting or abdominal pain     Negative for hematochezia or melena   Genitourinary: Negative for dysuria or hematuria   Musculoskeletal: Negative for myalgias or acute joint pain  Skin:   Negative for itching or rash  Neurological:  Negative for dizziness or headaches     Negative for speech disturbance     Negative for extremity weakness or paresthesias  Endo/Heme:  Negative for easy bruising or bleeding         Exam:   /62 (BP Location: Left arm, Patient Position: Sitting, BP Cuff Size: Adult)   Pulse 89   Temp 36.7 °C (98.1 °F) (Temporal)   Ht 1.753 m (5' 9\")   Wt 71.2 kg (157 lb)   SpO2 99%   BMI 23.18 kg/m²     Constitutional: Alert, oriented, no acute distress  HEENT:  Normocephalic and atraumatic, EOMI  Neck:   Supple, no JVD,   Cardiovascular: Regular rate and rhythm,   Pulmonary:  Good air entry bilaterally,    Abdominal:  Soft, non-tender, non-distended  Musculoskeletal: No tenderness, no deformity  Neurological:  CN II-XII grossly intact, no focal deficits  Skin:   Skin is warm and dry. No rash noted.  Vascular exam: Warm and well-perfused no edema    Imaging:   CTA shows aortoiliac occlusion from chronic aneurysmal disease    ABIs are around 0.55 bilaterally      Assessment and Plan:   -Peripheral arterial occlusive disease with severe bilateral lower extremity claudication and intermittent rest pain    Patient is having leg pain consistent with known aortoiliac occlusive disease.  Based on the severity of his symptoms I would recommend revascularization with aortobifemoral bypass.  We discussed the rationale for the procedure and the steps and the expected recovery.  We also discussed the risks.  Questions were answered and he agreed to proceed.    _____________________________________________________  Ady Lunsford MD  [facility]  [phone]  [address], [place] [address]      "

## 2023-12-01 NOTE — TELEPHONE ENCOUNTER
I called patient and scheduled procedure with Dr. Lunsford for 12/04 @ 7:30 am. Patient is to check in @ 5:30 am in [facility] 1st floor.     Patient has been instructed nothing to eat or drink 8 hours prior and will need a  once discharged.     I called patient to let him know that his insurance denied the procedure with Dr. Lunsford. Patient has no voicemail set up. I will call back again.